# Patient Record
Sex: FEMALE | Race: WHITE
[De-identification: names, ages, dates, MRNs, and addresses within clinical notes are randomized per-mention and may not be internally consistent; named-entity substitution may affect disease eponyms.]

---

## 2018-02-20 ENCOUNTER — HOSPITAL ENCOUNTER (OUTPATIENT)
Dept: HOSPITAL 17 - CLAB | Age: 73
End: 2018-02-20
Attending: FAMILY MEDICINE
Payer: MEDICARE

## 2018-02-20 DIAGNOSIS — C93.10: Primary | ICD-10-CM

## 2018-02-20 LAB
ALBUMIN SERPL-MCNC: 4.7 GM/DL (ref 3.4–5)
ALP SERPL-CCNC: 424 U/L (ref 45–117)
ALT SERPL-CCNC: 42 U/L (ref 10–53)
AST SERPL-CCNC: 123 U/L (ref 15–37)
BASOPHILS NFR BLD AUTO: 2 % (ref 0–2)
BILIRUB SERPL-MCNC: 0.9 MG/DL (ref 0.2–1)
BUN SERPL-MCNC: 16 MG/DL (ref 7–18)
CALCIUM SERPL-MCNC: 9 MG/DL (ref 8.5–10.1)
CHLORIDE SERPL-SCNC: 104 MEQ/L (ref 98–107)
CREAT SERPL-MCNC: 1.14 MG/DL (ref 0.5–1)
ERYTHROCYTE [DISTWIDTH] IN BLOOD BY AUTOMATED COUNT: 14.1 % (ref 11.6–17.2)
GFR SERPLBLD BASED ON 1.73 SQ M-ARVRAT: 47 ML/MIN (ref 89–?)
HCO3 BLD-SCNC: 26.5 MEQ/L (ref 21–32)
HCT VFR BLD CALC: 38.1 % (ref 35–46)
HGB BLD-MCNC: 12.5 GM/DL (ref 11.6–15.3)
LYMPHOCYTES: 5 % (ref 9–44)
MCH RBC QN AUTO: 27.7 PG (ref 27–34)
MCHC RBC AUTO-ENTMCNC: 32.8 % (ref 32–36)
MCV RBC AUTO: 84.2 FL (ref 80–100)
METAMYELOCYTES NFR BLD: 1 % (ref 0–1)
MONOCYTES: 40 % (ref 0–8)
MYELOCYTES NFR BLD: 4 % (ref 0–0)
NEUTS BAND # BLD MANUAL: 54.7 TH/MM3 (ref 1.8–7.7)
NEUTS BAND NFR BLD: 19 % (ref 0–6)
NEUTS SEG NFR BLD MANUAL: 29 % (ref 16–70)
PLATELET # BLD: 102 TH/MM3 (ref 150–450)
PMV BLD AUTO: 10 FL (ref 7–11)
PROT SERPL-MCNC: 8.3 GM/DL (ref 6.4–8.2)
RBC # BLD AUTO: 4.52 MIL/MM3 (ref 4–5.3)
SODIUM SERPL-SCNC: 138 MEQ/L (ref 136–145)
WBC # BLD AUTO: 103.3 TH/MM3 (ref 4–11)

## 2018-02-20 PROCEDURE — 85027 COMPLETE CBC AUTOMATED: CPT

## 2018-02-20 PROCEDURE — 85007 BL SMEAR W/DIFF WBC COUNT: CPT

## 2018-02-20 PROCEDURE — 80053 COMPREHEN METABOLIC PANEL: CPT

## 2018-02-20 PROCEDURE — 36415 COLL VENOUS BLD VENIPUNCTURE: CPT

## 2018-04-12 ENCOUNTER — HOSPITAL ENCOUNTER (INPATIENT)
Dept: HOSPITAL 17 - NEDDLT | Age: 73
LOS: 1 days | Discharge: HOME | DRG: 864 | End: 2018-04-13
Attending: FAMILY MEDICINE | Admitting: FAMILY MEDICINE
Payer: MEDICARE

## 2018-04-12 VITALS
RESPIRATION RATE: 17 BRPM | DIASTOLIC BLOOD PRESSURE: 60 MMHG | OXYGEN SATURATION: 96 % | SYSTOLIC BLOOD PRESSURE: 123 MMHG | TEMPERATURE: 98.8 F | HEART RATE: 91 BPM

## 2018-04-12 VITALS
TEMPERATURE: 99.2 F | SYSTOLIC BLOOD PRESSURE: 112 MMHG | DIASTOLIC BLOOD PRESSURE: 59 MMHG | HEART RATE: 88 BPM | OXYGEN SATURATION: 99 %

## 2018-04-12 VITALS
OXYGEN SATURATION: 97 % | SYSTOLIC BLOOD PRESSURE: 123 MMHG | RESPIRATION RATE: 16 BRPM | HEART RATE: 89 BPM | TEMPERATURE: 99.2 F | DIASTOLIC BLOOD PRESSURE: 67 MMHG

## 2018-04-12 VITALS
RESPIRATION RATE: 17 BRPM | DIASTOLIC BLOOD PRESSURE: 58 MMHG | HEART RATE: 85 BPM | OXYGEN SATURATION: 99 % | SYSTOLIC BLOOD PRESSURE: 121 MMHG | TEMPERATURE: 98.5 F

## 2018-04-12 VITALS
SYSTOLIC BLOOD PRESSURE: 112 MMHG | RESPIRATION RATE: 14 BRPM | OXYGEN SATURATION: 99 % | TEMPERATURE: 99.2 F | HEART RATE: 88 BPM | DIASTOLIC BLOOD PRESSURE: 59 MMHG

## 2018-04-12 VITALS
SYSTOLIC BLOOD PRESSURE: 112 MMHG | TEMPERATURE: 98.3 F | OXYGEN SATURATION: 98 % | HEART RATE: 91 BPM | DIASTOLIC BLOOD PRESSURE: 56 MMHG | RESPIRATION RATE: 18 BRPM

## 2018-04-12 VITALS
RESPIRATION RATE: 26 BRPM | OXYGEN SATURATION: 97 % | DIASTOLIC BLOOD PRESSURE: 55 MMHG | HEART RATE: 100 BPM | TEMPERATURE: 98.3 F | SYSTOLIC BLOOD PRESSURE: 94 MMHG

## 2018-04-12 VITALS — HEART RATE: 88 BPM

## 2018-04-12 DIAGNOSIS — D61.810: ICD-10-CM

## 2018-04-12 DIAGNOSIS — T45.1X5A: ICD-10-CM

## 2018-04-12 DIAGNOSIS — Z88.2: ICD-10-CM

## 2018-04-12 DIAGNOSIS — R50.9: Primary | ICD-10-CM

## 2018-04-12 DIAGNOSIS — I10: ICD-10-CM

## 2018-04-12 DIAGNOSIS — Z85.828: ICD-10-CM

## 2018-04-12 DIAGNOSIS — C93.10: ICD-10-CM

## 2018-04-12 DIAGNOSIS — E78.5: ICD-10-CM

## 2018-04-12 DIAGNOSIS — Z88.0: ICD-10-CM

## 2018-04-12 LAB
BASO STIPL BLD QL SMEAR: (no result)
BUN SERPL-MCNC: 12 MG/DL (ref 7–18)
CALCIUM SERPL-MCNC: 7.7 MG/DL (ref 8.5–10.1)
CHLORIDE SERPL-SCNC: 111 MEQ/L (ref 98–107)
CREAT SERPL-MCNC: 0.72 MG/DL (ref 0.5–1)
DACRYOCYTES BLD QL SMEAR: (no result)
DOHLE BOD BLD QL SMEAR: PRESENT
ERYTHROCYTE [DISTWIDTH] IN BLOOD BY AUTOMATED COUNT: 24.4 % (ref 11.6–17.2)
GFR SERPLBLD BASED ON 1.73 SQ M-ARVRAT: 80 ML/MIN (ref 89–?)
GLUCOSE SERPL-MCNC: 99 MG/DL (ref 74–106)
HCO3 BLD-SCNC: 24 MEQ/L (ref 21–32)
HCT VFR BLD CALC: 22 % (ref 35–46)
HGB BLD-MCNC: 7.3 GM/DL (ref 11.6–15.3)
LYMPHOCYTES: 32 % (ref 9–44)
MCH RBC QN AUTO: 31.7 PG (ref 27–34)
MCHC RBC AUTO-ENTMCNC: 33.4 % (ref 32–36)
MCV RBC AUTO: 94.9 FL (ref 80–100)
MONOCYTES: 21 % (ref 0–8)
NEUTS BAND # BLD MANUAL: 1.5 TH/MM3 (ref 1.8–7.7)
NEUTS BAND NFR BLD: 14 % (ref 0–6)
NEUTS SEG NFR BLD MANUAL: 32 % (ref 16–70)
OVALOCYTES BLD QL SMEAR: (no result)
PLATELET # BLD: 17 TH/MM3 (ref 150–450)
PMV BLD AUTO: 9.6 FL (ref 7–11)
RBC # BLD AUTO: 2.31 MIL/MM3 (ref 4–5.3)
SODIUM SERPL-SCNC: 141 MEQ/L (ref 136–145)
TOXIC GRANULES BLD QL SMEAR: (no result)
WBC # BLD AUTO: 3.3 TH/MM3 (ref 4–11)

## 2018-04-12 PROCEDURE — 87086 URINE CULTURE/COLONY COUNT: CPT

## 2018-04-12 PROCEDURE — 71045 X-RAY EXAM CHEST 1 VIEW: CPT

## 2018-04-12 PROCEDURE — 81001 URINALYSIS AUTO W/SCOPE: CPT

## 2018-04-12 PROCEDURE — 94150 VITAL CAPACITY TEST: CPT

## 2018-04-12 PROCEDURE — 96361 HYDRATE IV INFUSION ADD-ON: CPT

## 2018-04-12 PROCEDURE — 85007 BL SMEAR W/DIFF WBC COUNT: CPT

## 2018-04-12 PROCEDURE — 87040 BLOOD CULTURE FOR BACTERIA: CPT

## 2018-04-12 PROCEDURE — 80053 COMPREHEN METABOLIC PANEL: CPT

## 2018-04-12 PROCEDURE — 85730 THROMBOPLASTIN TIME PARTIAL: CPT

## 2018-04-12 PROCEDURE — 86901 BLOOD TYPING SEROLOGIC RH(D): CPT

## 2018-04-12 PROCEDURE — 85610 PROTHROMBIN TIME: CPT

## 2018-04-12 PROCEDURE — 80048 BASIC METABOLIC PNL TOTAL CA: CPT

## 2018-04-12 PROCEDURE — 85025 COMPLETE CBC W/AUTO DIFF WBC: CPT

## 2018-04-12 PROCEDURE — 96365 THER/PROPH/DIAG IV INF INIT: CPT

## 2018-04-12 PROCEDURE — 87804 INFLUENZA ASSAY W/OPTIC: CPT

## 2018-04-12 PROCEDURE — 85027 COMPLETE CBC AUTOMATED: CPT

## 2018-04-12 PROCEDURE — 96375 TX/PRO/DX INJ NEW DRUG ADDON: CPT

## 2018-04-12 PROCEDURE — 86850 RBC ANTIBODY SCREEN: CPT

## 2018-04-12 PROCEDURE — 86900 BLOOD TYPING SEROLOGIC ABO: CPT

## 2018-04-12 PROCEDURE — 83605 ASSAY OF LACTIC ACID: CPT

## 2018-04-12 RX ADMIN — STANDARDIZED SENNA CONCENTRATE AND DOCUSATE SODIUM SCH TAB: 8.6; 5 TABLET, FILM COATED ORAL at 21:00

## 2018-04-12 RX ADMIN — AZTREONAM SCH MLS/HR: 2 INJECTION, POWDER, LYOPHILIZED, FOR SOLUTION INTRAMUSCULAR; INTRAVENOUS at 14:15

## 2018-04-12 RX ADMIN — ESTROGENS, CONJUGATED SCH MG: 0.62 TABLET, FILM COATED ORAL at 12:00

## 2018-04-12 RX ADMIN — STANDARDIZED SENNA CONCENTRATE AND DOCUSATE SODIUM SCH TAB: 8.6; 5 TABLET, FILM COATED ORAL at 08:50

## 2018-04-12 RX ADMIN — ENALAPRIL MALEATE SCH MG: 10 TABLET ORAL at 13:03

## 2018-04-12 RX ADMIN — Medication SCH ML: at 08:51

## 2018-04-12 RX ADMIN — AZTREONAM SCH MLS/HR: 2 INJECTION, POWDER, LYOPHILIZED, FOR SOLUTION INTRAMUSCULAR; INTRAVENOUS at 06:37

## 2018-04-12 RX ADMIN — AZTREONAM SCH MLS/HR: 2 INJECTION, POWDER, LYOPHILIZED, FOR SOLUTION INTRAMUSCULAR; INTRAVENOUS at 22:07

## 2018-04-12 RX ADMIN — Medication SCH ML: at 22:07

## 2018-04-12 NOTE — HHI.HP
[facility]


Service


Family Medicine


Primary Care Physician


Tyrone Beauchamp MD


Admission Diagnosis





Diagnoses:  


International Travel<30 Days:  No


Contact w/Intl Traveler<30days:  No


History of Present Illness


73-year-old female, past medical history of CML with recent IV regimen 

treatment ending on , presents with asymptomatic fevers at home. Patient 

states she is instructed to take her temperature twice a day, and she only 

somewhat follows this regimen. At home should a temperature at 6 PM on  of 

100.4, and then a repeat temperature at 9:30 PM on  of 102.0. Patient called 

her on-call physician oncologist who told her to go to the nearest ED for a 

workup. Patient went to the delay and ED and had a temperature of 100.3 at 

2300. She continued to be asymptomatic, aside from feeling quotation icky". She 

denies any body aches. Denies any nausea/vomiting. Patient denies any shortness 

of breath or cough. No respiratory difficulties. No chest pain. No change in 

urination or increased burning with urination. No change in bowel movements. No 

abdominal pain or cramping.


 (Adri Lee MD R2)





Review of Systems


Constitutional:  DENIES: Fatigue, Weight gain


Endocrine:  DENIES: Polyuria, Polyphagia


Eyes:  DENIES: Diplopia, Eye inflammation


Ears, nose, mouth, throat:  DENIES: Nasal discharge, Oral lesions


Respiratory:  DENIES: Shortness of breath


Cardiovascular:  DENIES: Palpitations, Syncope


Gastrointestinal:  DENIES: Diarrhea, Nausea


Genitourinary:  DENIES: Dysmenorrhea, Dyspareunia


Musculoskeletal:  DENIES: Back pain, Neck pain


Neurologic:  DENIES: Headache


Psychiatric:  DENIES: Depression (Adri Lee MD R2)





Past Family Social History


Past Medical History


Hypertension


hyperlipidemia


hormone replacement therapy post menopause. 


CML, under care of Dr. Conway and [facility]


Past Surgical History


Bilateral breast augmentation


removal of basal cell carcinomas


 bilateral cataract extraction 


intraocular lens implantation 


removal of perivaginal benign growth 2014


Spiral comminuted fracture of the right femur 1982


Bunion surgery x 2


 (Adri Lee MD R2)


Allergies:  


Coded Allergies:  


     Sulfa (Sulfonamide Antibiotics) (Unverified  Allergy, Intermediate, Fever 

and Rash, 18)


     penicillin G (Unverified  Allergy, Intermediate, Rash, 18)


     bacitracin (Unverified  Allergy, Mild, Rash, 18)


     polymyxin B (Unverified  Allergy, Mild, Rash, 18)


Family History


Father  age 84, Occasions of squamous cell carcinoma, mother  

age 74, history of a myocardial infarction.  Agent has 3 sisters and 2 brothers

, all alive and well.  Patient has a son who is institutionalized, has heart 

failure, brain damage, reduced lung function.  Positive family history of 

hypertension, alcoholism, kidney disease and squamous cell carcinoma.


Social History


Formally educated through law school.  Single.  Retired in  from being 

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 

commissioner for the town [facility]





Tobacco none, occasional alcohol.  Exercises regularly


 (Adri Lee MD R2)





Physical Exam


Vital Signs





Vital Signs








  Date Time  Temp Pulse Resp B/P (MAP) Pulse Ox O2 Delivery O2 Flow Rate FiO2


 


18 07:38 99.2 88 14 112/59 (76) 99   


 


18 06:15      Room Air  


 


18 05:15 98.5 85 17 121/58 (79) 99   








Physical Exam


GENERAL: This is a well-nourished, well-developed patient, in no apparent 

distress.


SKIN: No rashes, ecchymoses or lesions. Cool and dry.


HEAD: Atraumatic. Normocephalic. No temporal or scalp tenderness.


EYES: Pupils equal round and reactive. Extraocular motions intact. No scleral 

icterus. No injection or drainage. 


ENT: Nose without bleeding, purulent drainage or septal hematoma. Throat 

without erythema, tonsillar hypertrophy or exudate. Uvula midline. Airway 

patent.


NECK: Trachea midline. No JVD or lymphadenopathy. Supple, nontender, no 

meningeal signs.


CARDIOVASCULAR: Regular rate and rhythm without murmurs, gallops, or rubs. 


RESPIRATORY: Clear to auscultation. Breath sounds equal bilaterally. Mild 

crackles in LLL


GASTROINTESTINAL: Abdomen soft, non-tender, nondistended. No hepato-splenomegaly

, or palpable masses. No guarding. No peritoneal signs


MUSCULOSKELETAL: Extremities without clubbing, cyanosis, or edema. No joint 

tenderness, effusion, or edema noted. No calf tenderness. Negative Homans sign 

bilaterally.


NEUROLOGICAL: Awake and alert. Cranial nerves II through XII intact.  Motor and 

sensory grossly within normal limits. Five out of 5 muscle strength in all 

muscle groups.  Normal speech.


 (Adri Lee MD R2)





Septic Shock Reassessment


Septic shock perfusion:  reassessment completed


 (Adri Lee MD R2)





Caprini VTE Risk Assessment


Caprini VTE Risk Assessment:  No/Low Risk (score <= 1)


Caprini Risk Assessment Model











 Point Value = 1          Point Value = 2  Point Value = 3  Point Value = 5


 


Age 41-60


Minor surgery


BMI > 25 kg/m2


Swollen legs


Varicose veins


Pregnancy or postpartum


History of unexplained or recurrent


   spontaneous 


Oral contraceptives or hormone


   replacement


Sepsis (< 1 month)


Serious lung disease, including


   pneumonia (< 1 month)


Abnormal pulmonary function


Acute myocardial infarction


Congestive heart failure (< 1 month)


History of inflammatory bowel disease


Medical patient at bed rest Age 61-74


Arthroscopic surgery


Major open surgery (> 45 min)


Laparoscopic surgery (> 45 min)


Malignancy


Confined to bed (> 72 hours)


Immobilizing plaster cast


Central venous access Age >= 75


History of VTE


Family history of VTE


Factor V Leiden


Prothrombin 15648H


Lupus anticoagulant


Anticardiolipin antibodies


Elevated serum homocysteine


Heparin-induced thrombocytopenia


Other congenital or acquired


   thrombophilia Stroke (< 1 month)


Elective arthroplasty


Hip, pelvis, or leg fracture


Acute spinal cord injury (< 1 month)








Prophylaxis Regimen











   Total Risk


Factor Score Risk Level Prophylaxis Regimen


 


0-1      Low Early ambulation


 


2 Moderate Order ONE of the following:


*Sequential Compression Device (SCD)


*Heparin 5000 units SQ BID


 


3-4 Higher Order ONE of the following medications:


*Heparin 5000 units SQ TID


*Enoxaparin/Lovenox 40 mg SQ daily (WT < 150 kg, CrCl > 30 mL/min)


*Enoxaparin/Lovenox 30 mg SQ daily (WT < 150 kg, CrCl > 10-29 mL/min)


*Enoxaparin/Lovenox 30 mg SQ BID (WT < 150 kg, CrCl > 30 mL/min)


AND/OR


*Sequential Compression Device (SCD)


 


5 or more Highest Order ONE of the following medications:


*Heparin 5000 units SQ TID (Preferred with Epidurals)


*Enoxaparin/Lovenox 40 mg SQ daily (WT < 150 kg, CrCl > 30 mL/min)


*Enoxaparin/Lovenox 30 mg SQ daily (WT < 150 kg, CrCl > 10-29 mL/min)


*Enoxaparin/Lovenox 30 mg SQ BID (WT < 150 kg, CrCl > 30 mL/min)


AND


*Sequential Compression Device (SCD)








 (Adri Lee MD R2)





Assessment and Plan


Assessment and Plan


73-year-old female, history of CML with recent chemotherapy treatment, presents 

with fever of unknown origin. Now afebrile since  at 00:46. Asymptomatic.


Code Status


Full code


Discussed Condition With


Dr. Nito De La Cruz


 (Adri Lee MD R2)


Attending Attestation


Patient seen and examined, discussed with resident team. I agree with 

assessment and management as documented and discussed with me.


The patient has been seen and examined. The chart and all resident notes have 

been reviewed. I agree that inpatient care is appropriate and that a two 

midnight stay is expected for the reasons documented in the resident history 

and physical. I have discussed this with the resident and certify the resident

s order for inpatient admission.





Pt without complaints. She denies cough, SOB, chest pain, nausea/vomiting, 

dysuria. 


She is afebrile since arrival to the hospital.





Continue antibiotics.


Await cultures.


Await resp panel.





Additional diagnosis:


Chronic myelomonocytic leukemia: Follows with Dr Conway. Appreciate oncology, 

who has been consulted.


 (Estella Mayo MD)


Problem List:  


(1) SIRS (systemic inflammatory response syndrome)


ICD Codes:  R65.10 - Systemic inflammatory response syndrome (SIRS) of non-

infectious origin without acute organ dysfunction


Status:  Acute


Plan:  On admission: Temp 100.3, , RR 20, no obvious source , asymptomatic


- UA: small blood, moderate bacteria, negative leuk est, negative nitrite


- CXR negative





f/u blood cultures


f/u urine cx


f/u Resp Panel


f/u VS





Incentive spirometer at bedside





(2) Monocytosis


ICD Codes:  D72.821 - Monocytosis (symptomatic)


Status:  Chronic


Plan:  Dx CML,  Dr. Arturo Conway as well as [facility] oncologist Dr. Banerjee


Last discussed in  with  PCP


 Patient is currently undergoing chemotherapy with decitabine 20mg/m2 on days 1-

5 every 28 days, last regimen finished on 





She has been having her platelet count monitored very closely as it has been 

trending downward.  She is aware that she will require a platelet transfusion 

should her platelet count approach 10,000 (sooner if she has indications of 

bleeding) and her most recent blood work performed 18 indicated her 

platelet count was 17,000





f/u recs of 





(3) Thrombocytopenia


ICD Codes:  D69.6 - Thrombocytopenia, unspecified


Status:  Chronic


Plan:  Stable


Related to CML as above


Continue to follow





(4) Hypertension


ICD Codes:  I10 - Essential (primary) hypertension


Status:  Chronic


Plan:  BP WNL


Continue home regimen





(5) fen/ppx


Status:  Acute


Plan:  Fluids: Encourage by mouth fluid


Electrolytes: Follow-up BMP and replete as needed


Nutrition: Regular diet





GI prophylaxis: Not indicated


DVT prophylaxis: Thrombocytopenic, not indicated


 (Adri Lee MD R2)





Physician Certification


2 Midnight Certification Type:  Admission for Inpatient Services


Order for Inpatient Services


The services are ordered in accordance with Medicare regulations or non-

Medicare payer requirements, as applicable.  In the case of services not 

specified as inpatient-only, they are appropriately provided as inpatient 

services in accordance with the 2-midnight benchmark.


Estimated LOS (days):  2


 days is the estimated time the patient will need to remain in the hospital, 

assuming treatment plan goals are met and no additional complications.


Post-Hospital Plan:  Home


 (Adri Lee MD R2)











Adri Lee MD R2 2018 09:48


Estella Mayo MD 2018 07:09

## 2018-04-12 NOTE — MB
cc:

Arturo Conway MD

****

 

 

DATE:

04/12/2018

 

REASON FOR CONSULTATION:

Patient with fever and history of chronic myelomonocytic leukemia,

undergoing treatment with decitabine.

 

PATIENT PROFILE:

The patient is a 73-year-old female.  She is .  She has been 

 3 times.  She has a male .  She has never smoked.  

She has 1 son who is 51, brain damaged and lives in an institution.  

The patient lives in her own home.  When she was well she would 

exercise regularly.  She was born in [place] and has lived in [place] 

since 1955.  She is a retired  and worked for the [employer].  She

currently does not drink.  In the past she had 2 drinks a day.  She 

has been very active until her current illness enjoying sailing, 

reading, traveling, [employer] and bridge.

 

HISTORY OF PRESENT ILLNESS:

The patient has been in excellent health.  In approximately 2009, she 

was noted to have a slight increase in the percentage of monocytes.  

This was stable until 07/2017 when the white count anu to 11,400 with

29%, monocytes.

 

On 11/14/2017, the patient had a hemoglobin of 12.5, white count 

18,000 with 45% polys, 43% monocytes and a platelet count of 89,000.

 

She underwent a bone marrow aspirate and biopsy.  She was found to 

have chronic myelomonocytic leukemia, which was rapidly progressive 

with a white count rising weekly.

 

I referred her to the [facility].  She was initially 

treated with hydroxyurea and most recently has received decitabine.  I

believe her last treatment with decitabine was approximately 11 days 

ago.

 

Last week, she went to the [facility] ER for discussion 

regarding bone marrow transplant.  She had 4 siblings who were 

evaluated and none were a match.  This was devastating for her.

 

Last night, she developed a temperature to 102.  She did not have any 

specific symptoms.  She came to the emergency room at the request of 

my partner.  She had a CBC and platelet count done on 04/12.  

Hemoglobin was 8.4, white count 4800, and platelets 18,000.  The 

absolute monocyte count was 1300.  The absolute neutrophil count was 

2300.  In spite of the platelet count of 18,000 she has had no 

bleeding.  There were no blasts noted on the peripheral smear.

 

She had blood cultures drawn, which are pending.  A urine culture is 

pending.  Influenza nasal washing is negative.  A UA was suggestive of

an infection with a moderate amount of bacteria but was not a good clean catch. 

 

Since the hospitalization, her temperature has fallen. When she 

arrived it was 100.5 and she is currently afebrile.  Her most recent 

CBC and platelet count- hemoglobin 7.3, white count 3300, platelets 

17,000 with total neutrophil count of 1500.  In spite of the low 

hemoglobin, she denies any shortness of breath, chest pain, or 

lightheadedness.

 

PAST SURGICAL HISTORY:

1.  Comminuted fracture of the right femur as a result of a skiing 

accident in [place], [place] requiring a plate and 17 screws.  The 

hardware was removed in 1984.

2.  Partial hysterectomy in 1997 for uterine bleeding, ovaries remain.

3.  She has had breast implants, which were removed and then replaced.

4.  Plastic surgery in 1989.

5.  Tubal ligation.

 

PAST MEDICAL HISTORY:

1.  Diagnosis of chronic myelomonocytic leukemia starting decitabine 

20 mg/m2 IV daily for 5 days on 03/26/2018.

2.  History of hypertension.

3.  Previous history of irritable bowel syndrome.

4.  Diverticulosis.

5.  Cataracts.

 

ALLERGIES:

1.  ____.

2.  BACITRACIN.

3.  PENICILLIN.

4.  POLYMYXIN.

5.  SULFA.

 

MEDICATIONS PRIOR TO ADMISSION:

1.  Recent decitabine.

2.  Vasotec 10 mg a day.

3.  Estrogen 0.625 mg a day.

4.  Multiple vitamins.

 

CURRENT MEDICINES IN THE HOSPITAL:

Include Azactam 2000 mg q. 8 hours.

 

FAMILY HISTORY:

Noncontributory.

 

REVIEW OF SYSTEMS:

No change in hearing.  No chest pain, palpitations.  No shortness of 

breath or chest pain.  No abdominal or pelvic pain .  No dysuria or 

burning.  There has been some urinary frequency.  No change in bowel 

habits.

PSYCHIATRIC:  Recent depression and anxiety related to her illness.

 

PHYSICAL EXAMINATION:

GENERAL:  The patient appears well.

VITAL SIGNS:  Blood pressure 112/60, respiratory rate 18, pulse 90, 

afebrile, O2 saturation 98% on room air.

HEENT:  Head is normocephalic.  Sclerae and conjunctivae are normal.  

Oropharynx unremarkable.

LYMPHATICS:  There is no cervical, supraclavicular, axillary or 

inguinal adenopathy.

HEART:  Regular rate and rhythm.

LUNGS:  Clear, without rales, wheezes, or rhonchi.

ABDOMEN:  Without hepatosplenomegaly, masses or tenderness.

EXTREMITIES:  No edema.

MUSCULOSKELETAL:  No bone pain.

NEUROLOGIC:  No weakness.  Cognition, affect normal.

SKIN:  Normal.

PSYCHIATRIC: The patient appropriately anxious and due to some extent 

depressed.

 

ASSESSMENT:

The patient is a 73-year-old female.  She has chronic myelomonocytic 

leukemia.  Her immediate pancytopenia is due to the decitabine.  It 

appears that her fever may be related to a urinary tract infection 

with cultures pending and her temperature has come down.  Her 

hemoglobin is 7.3.  I suggest that she consider a transfusion, but she

would like to wait until tomorrow.

 

PLAN:

1.  Continue Azactam.

2.  CBC and platelet count tomorrow and if hemoglobin drops, I would 

certainly want to give her a transfusion and with irradiated blood.

3.  If she is afebrile tomorrow  and blood cultures are negative, it would be 

reasonable to send her home on oral antibiotic such as Levaquin or 

ciprofloxacin.

4.  She has thrombocytopenia.  She is not bleeding.  If the platelet 

count reaches 10,000 or less, then I would give her platelet 

transfusion, but at the present time, I would leave her alone.

5.  Four of her family members do not match for a transplant.  She has

a fifth member who will possibly undergo evaluation.

 

 

__________________________________

MD OSCAR Mendoza/rt

D: 04/12/2018, 09:30 PM

T: 04/12/2018, 10:07 PM

Visit #: [id number]

Job #: [id number]

KANDACE

## 2018-04-13 VITALS — HEART RATE: 90 BPM

## 2018-04-13 VITALS
DIASTOLIC BLOOD PRESSURE: 65 MMHG | TEMPERATURE: 99.3 F | HEART RATE: 91 BPM | OXYGEN SATURATION: 97 % | RESPIRATION RATE: 17 BRPM | SYSTOLIC BLOOD PRESSURE: 129 MMHG

## 2018-04-13 VITALS — HEART RATE: 86 BPM

## 2018-04-13 VITALS — HEART RATE: 91 BPM

## 2018-04-13 VITALS
TEMPERATURE: 98.5 F | RESPIRATION RATE: 16 BRPM | SYSTOLIC BLOOD PRESSURE: 116 MMHG | OXYGEN SATURATION: 97 % | DIASTOLIC BLOOD PRESSURE: 65 MMHG | HEART RATE: 84 BPM

## 2018-04-13 VITALS
RESPIRATION RATE: 16 BRPM | HEART RATE: 87 BPM | DIASTOLIC BLOOD PRESSURE: 63 MMHG | SYSTOLIC BLOOD PRESSURE: 120 MMHG | TEMPERATURE: 99.1 F | OXYGEN SATURATION: 98 %

## 2018-04-13 VITALS — HEART RATE: 89 BPM

## 2018-04-13 VITALS — HEART RATE: 88 BPM

## 2018-04-13 LAB
ACANTHOCYTES BLD QL SMEAR: (no result)
ALBUMIN SERPL-MCNC: 2.9 GM/DL (ref 3.4–5)
ALP SERPL-CCNC: 128 U/L (ref 45–117)
ALT SERPL-CCNC: 19 U/L (ref 10–53)
AST SERPL-CCNC: 15 U/L (ref 15–37)
BASOPHILS # BLD AUTO: 0 TH/MM3 (ref 0–0.2)
BASOPHILS NFR BLD: 0.7 % (ref 0–2)
BILIRUB SERPL-MCNC: 1.2 MG/DL (ref 0.2–1)
BUN SERPL-MCNC: 13 MG/DL (ref 7–18)
CALCIUM SERPL-MCNC: 8.1 MG/DL (ref 8.5–10.1)
CHLORIDE SERPL-SCNC: 112 MEQ/L (ref 98–107)
CREAT SERPL-MCNC: 0.65 MG/DL (ref 0.5–1)
DACRYOCYTES BLD QL SMEAR: (no result)
EOSINOPHIL # BLD: 0 TH/MM3 (ref 0–0.4)
EOSINOPHIL NFR BLD: 0.7 % (ref 0–4)
ERYTHROCYTE [DISTWIDTH] IN BLOOD BY AUTOMATED COUNT: 24.5 % (ref 11.6–17.2)
GFR SERPLBLD BASED ON 1.73 SQ M-ARVRAT: 90 ML/MIN (ref 89–?)
GLUCOSE SERPL-MCNC: 99 MG/DL (ref 74–106)
HCO3 BLD-SCNC: 23.5 MEQ/L (ref 21–32)
HCT VFR BLD CALC: 21.1 % (ref 35–46)
HELMET CELLS BLD QL SMEAR: (no result)
HGB BLD-MCNC: 7.1 GM/DL (ref 11.6–15.3)
LYMPHOCYTES # BLD AUTO: 1 TH/MM3 (ref 1–4.8)
LYMPHOCYTES NFR BLD AUTO: 38.8 % (ref 9–44)
MCH RBC QN AUTO: 31.7 PG (ref 27–34)
MCHC RBC AUTO-ENTMCNC: 33.8 % (ref 32–36)
MCV RBC AUTO: 93.9 FL (ref 80–100)
MONOCYTE #: 0.5 TH/MM3 (ref 0–0.9)
MONOCYTES NFR BLD: 19.6 % (ref 0–8)
NEUTROPHILS # BLD AUTO: 1 TH/MM3 (ref 1.8–7.7)
NEUTROPHILS NFR BLD AUTO: 40.2 % (ref 16–70)
PLATELET # BLD: 24 TH/MM3 (ref 150–450)
PMV BLD AUTO: 11.1 FL (ref 7–11)
PROT SERPL-MCNC: 5.9 GM/DL (ref 6.4–8.2)
RBC # BLD AUTO: 2.25 MIL/MM3 (ref 4–5.3)
SODIUM SERPL-SCNC: 142 MEQ/L (ref 136–145)
WBC # BLD AUTO: 2.5 TH/MM3 (ref 4–11)

## 2018-04-13 RX ADMIN — AZTREONAM SCH MLS/HR: 2 INJECTION, POWDER, LYOPHILIZED, FOR SOLUTION INTRAMUSCULAR; INTRAVENOUS at 06:03

## 2018-04-13 RX ADMIN — STANDARDIZED SENNA CONCENTRATE AND DOCUSATE SODIUM SCH TAB: 8.6; 5 TABLET, FILM COATED ORAL at 09:00

## 2018-04-13 RX ADMIN — ESTROGENS, CONJUGATED SCH MG: 0.62 TABLET, FILM COATED ORAL at 09:00

## 2018-04-13 RX ADMIN — ENALAPRIL MALEATE SCH MG: 10 TABLET ORAL at 09:56

## 2018-04-13 NOTE — PD.ONC.PN
Subjective


Subjective


Remarks


Afebrile overnight. 


Patient resting in bed. 


denies dizziness or fatigue. 


does not want a blood transfusion at this time.





Objective


Data











  Date Time  Temp Pulse Resp B/P (MAP) Pulse Ox O2 Delivery O2 Flow Rate FiO2


 


4/13/18 08:11      Room Air  


 


4/13/18 08:11 98.5 84 16 116/65 (82) 97   


 


4/13/18 07:54  86      


 


4/13/18 06:00  86      


 


4/13/18 05:00  90      


 


4/13/18 04:19 99.3 91 17 129/65 (86) 97   


 


4/13/18 04:00  88      


 


4/13/18 04:00  89      


 


4/13/18 03:00  88      


 


4/13/18 02:00  88      


 


4/13/18 01:00  88      


 


4/13/18 00:23 99.1 87 16 120/63 (82) 98   


 


4/13/18 00:00  88      


 


4/13/18 00:00  85      


 


4/12/18 23:00  88      


 


4/12/18 22:00  88      


 


4/12/18 22:00  92      


 


4/12/18 21:56     97 Room Air  


 


4/12/18 21:56 99.2 89 16 123/67 (85) 97   


 


4/12/18 21:00 98.8 91 17 123/60 (81) 96   


 


4/12/18 16:00 98.3 91 18 112/56 (74) 98   


 


4/12/18 13:38     97 Room Air  


 


4/12/18 11:20 99.8 91 14 121/60 (80) 97   


 


4/12/18 11:20 99.8 91 14 121/60 (80) 97   


 


4/12/18 10:02     99 Room Air  














 4/13/18 4/13/18 4/13/18





 07:00 15:00 23:00


 


Intake Total 360 ml  


 


Output Total 800 ml  


 


Balance -440 ml  








Result Diagram:  


4/13/18 0335                                                                   

             4/13/18 0335





Laboratory Results





Laboratory Tests








Test


  4/12/18


12:48 4/13/18


03:35


 


White Blood Count 3.3 TH/MM3  2.5 TH/MM3 


 


Red Blood Count 2.31 MIL/MM3  2.25 MIL/MM3 


 


Hemoglobin 7.3 GM/DL  7.1 GM/DL 


 


Hematocrit 22.0 %  21.1 % 


 


Mean Corpuscular Volume 94.9 FL  93.9 FL 


 


Mean Corpuscular Hemoglobin 31.7 PG  31.7 PG 


 


Mean Corpuscular Hemoglobin


Concent 33.4 % 


  33.8 % 


 


 


Red Cell Distribution Width 24.4 %  24.5 % 


 


Platelet Count 17 TH/MM3  24 TH/MM3 


 


Mean Platelet Volume 9.6 FL  11.1 FL 


 


CBC Comment AUTO DIFF  AUTO DIFF 


 


Differential Total Cells


Counted 100 


  


 


 


Neutrophils % (Manual) 32 %  


 


Band Neutrophils % 14 %  


 


Lymphocytes % 32 %  


 


Monocytes % 21 %  


 


Eosinophils % 1 %  


 


Neutrophils # (Manual) 1.5 TH/MM3  


 


Differential Comment


  FINAL DIFF


MANUAL AUTO DIFF


CONFIRMED


 


Toxic Granulation 1+  


 


Dohle Bodies PRESENT  


 


Platelet Estimate RARE  LOW 


 


Platelet Morphology Comment NORMAL  NORMAL 


 


Basophilic Stippling FAINT  


 


Tear Drop Cells 1+  1+ 


 


Ovalocytes 1+  


 


Blood Urea Nitrogen 12 MG/DL  13 MG/DL 


 


Creatinine 0.72 MG/DL  0.65 MG/DL 


 


Random Glucose 99 MG/DL  99 MG/DL 


 


Calcium Level 7.7 MG/DL  8.1 MG/DL 


 


Sodium Level 141 MEQ/L  142 MEQ/L 


 


Potassium Level 3.8 MEQ/L  3.9 MEQ/L 


 


Chloride Level 111 MEQ/L  112 MEQ/L 


 


Carbon Dioxide Level 24.0 MEQ/L  23.5 MEQ/L 


 


Anion Gap 6 MEQ/L  7 MEQ/L 


 


Estimat Glomerular Filtration


Rate 80 ML/MIN 


  90 ML/MIN 


 


 


Neutrophils (%) (Auto)  40.2 % 


 


Lymphocytes (%) (Auto)  38.8 % 


 


Monocytes (%) (Auto)  19.6 % 


 


Eosinophils (%) (Auto)  0.7 % 


 


Basophils (%) (Auto)  0.7 % 


 


Neutrophils # (Auto)  1.0 TH/MM3 


 


Lymphocytes # (Auto)  1.0 TH/MM3 


 


Monocytes # (Auto)  0.5 TH/MM3 


 


Eosinophils # (Auto)  0.0 TH/MM3 


 


Basophils # (Auto)  0.0 TH/MM3 


 


Helmet Cells  OCC 


 


Acanthocytes  OCC 


 


Total Protein  5.9 GM/DL 


 


Albumin  2.9 GM/DL 


 


Alkaline Phosphatase  128 U/L 


 


Aspartate Amino Transf


(AST/SGOT) 


  15 U/L 


 


 


Alanine Aminotransferase


(ALT/SGPT) 


  19 U/L 


 


 


Total Bilirubin  1.2 MG/DL 











Administered Medications








 Medications


  (Trade)  Dose


 Ordered  Sig/Neymar


 Route


 PRN Reason  Start Time


 Stop Time Status Last Admin


Dose Admin


 


 Sodium Chloride


  (NS Flush)  2 ml  BID


 IV FLUSH


   4/12/18 09:00


    4/12/18 22:07


 


 


 Aztreonam 2000 mg/


 Sodium Chloride  100 ml @ 


 200 mls/hr  Q8H


 IV


   4/12/18 06:00


    4/13/18 06:03


 


 


 Enalapril Maleate


  (Vasotec)  10 mg  DAILY


 PO


   4/12/18 10:30


    4/12/18 13:03


 








Objective Remarks


GENERAL: pleasant female, sitting up in bed in nad. 


SKIN: Warm and dry.


HEAD: Normocephalic.


EYES: No injection or drainage. 


NECK: Supple, trachea midline. 


CARDIOVASCULAR: Regular rate and rhythm


RESPIRATORY: Breath sounds equal bilaterally. No accessory muscle use.


GASTROINTESTINAL: Abdomen soft, non-tender, nondistended. 


EXTREMITIES: No cyanosis


NEUROLOGICAL: No obvious focal deficit. Awake, alert, and oriented x3.





Assessment/Plan


Assessment


72y/o female with CMML admitted with fever, now afebrile.


Plan


1. the patient is afebrile and feeling improved. she is clear for discharge 

from oncology perspective.  I would send her home on Cipro 500mg PO q 12 hours 

x 7 days. 


2. the patient has a hemoglobin of 7.1.  however she is asymptomatic and 

prefers not to have a blood transfusion at this time.  We will arrange follow 

up in the clinic early next week and obtain repeat CBC and for blood 

transfusion in the clinic.


Attending Statement


The exam, history, and the medical decision-making described in the above note 

were completed with the assistance of the mid-level provider. I reviewed and 

agree with the findings presented.  I attest that I had a face-to-face 

encounter with the patient on the same day, and personally performed and 

documented my assessment and findings in the medical record.


patient doing well this am and anxious to go home.  no bleeding and 

asymptomatic from anemia and she would prefer not to have blood transfusion.   

exam benign and no temperature since those surrounding admission.  At this 

point can go home with po cipro and I will see next week.  She will have a cbc 

plat count either monday or tuesday of next week and if temp reoccurs she will 

contact us or go to ER.   All this was discussed with her.











Kaylan Dennis Apr 13, 2018 09:10


Arturo Conway MD Apr 13, 2018 13:57

## 2018-04-13 NOTE — HHI.FPPN
Subjective


Remarks


Patient seen and examined this morning bedside.  Patient continues to be 

asymptomatic states that her oncologist told her she can go home.  No acute 

events overnight.  No fever/chills.  No chest pain/shortness of breath/

dizziness.


 (Adri Lee MD R2)





Objective


Vitals





Vital Signs








  Date Time  Temp Pulse Resp B/P (MAP) Pulse Ox O2 Delivery O2 Flow Rate FiO2


 


4/13/18 08:11      Room Air  


 


4/13/18 08:11 98.5 84 16 116/65 (82) 97   


 


4/13/18 07:54  86      


 


4/13/18 06:00  86      


 


4/13/18 05:00  90      


 


4/13/18 04:19 99.3 91 17 129/65 (86) 97   


 


4/13/18 04:00  88      


 


4/13/18 04:00  89      


 


4/13/18 03:00  88      


 


4/13/18 02:00  88      


 


4/13/18 01:00  88      


 


4/13/18 00:23 99.1 87 16 120/63 (82) 98   


 


4/13/18 00:00  88      


 


4/13/18 00:00  85      


 


4/12/18 23:00  88      


 


4/12/18 22:00  88      


 


4/12/18 22:00  92      


 


4/12/18 21:56     97 Room Air  


 


4/12/18 21:56 99.2 89 16 123/67 (85) 97   


 


4/12/18 21:00 98.8 91 17 123/60 (81) 96   


 


4/12/18 16:00 98.3 91 18 112/56 (74) 98   


 


4/12/18 13:38     97 Room Air  


 


4/12/18 11:20 99.8 91 14 121/60 (80) 97   


 


4/12/18 11:20 99.8 91 14 121/60 (80) 97   














I/O      


 


 4/12/18 4/12/18 4/12/18 4/13/18 4/13/18 4/13/18





 07:00 15:00 23:00 07:00 15:00 23:00


 


Intake Total  400 ml  360 ml  


 


Output Total    800 ml  


 


Balance  400 ml  -440 ml  


 


      


 


Intake Oral  400 ml  360 ml  


 


Output Urine Total    800 ml  


 


# Voids  3    








 (Adri Lee MD R2)


Result Diagram:  


4/13/18 0335 4/13/18 0335





Objective Remarks


GENERAL: 


SKIN: Warm and dry.


HEAD: Normocephalic.


EYES: No scleral icterus. No injection or drainage. 


NECK: Supple, trachea midline. No JVD or lymphadenopathy.


CARDIOVASCULAR: Regular rate and rhythm without murmurs, gallops, or rubs. 


RESPIRATORY: Breath sounds equal bilaterally. No accessory muscle use.


GASTROINTESTINAL: Abdomen soft, non-tender, nondistended. 


MUSCULOSKELETAL: No cyanosis, or edema. 


BACK: Nontender without obvious deformity. No CVA tenderness.





 (Adri Lee MD R2)





A/P


Assessment and Plan


73-year-old female, history of CML with recent chemotherapy treatment, presents 

with fever of unknown origin. Now afebrile since 4/12 at 00:46. Asymptomatic.


Discharge Planning


Oncology has cleared for discharge today


 (Adri Lee MD R2)


Attending Attestation


Patient seen and examined, discussed with resident team. I agree with 

assessment and management as documented and discussed with me.





Pt without complaints. She feels ready for discharge.


Pt has been cleared by oncology for discharge with PO cipro. Discussed risks, 

benefits, side effects of this medication with patient.





Discharge home today, with follow up labs and evaluation by oncology next week.


 (Estella Mayo MD)


Problem List:  


(1) SIRS (systemic inflammatory response syndrome)


ICD Codes:  R65.10 - Systemic inflammatory response syndrome (SIRS) of non-

infectious origin without acute organ dysfunction


Status:  Acute


Plan:  Vital signs now stable


Afebrile over 24 hours


No source of infection








On admission: Temp 100.3, , RR 20, no obvious source , asymptomatic


- UA: small blood, moderate bacteria, negative leuk est, negative nitrite


- CXR negative





f/u blood cultures


f/u urine cx


f/u Resp Panel


f/u VS





Incentive spirometer at bedside





(2) Monocytosis


ICD Codes:  D72.821 - Monocytosis (symptomatic)


Status:  Chronic


Plan:  Dx CML,  Dr. Arturo Conway as well as [facility] oncologist Dr. Banerjee


Last discussed in 03/18 with  PCP


 Patient is currently undergoing chemotherapy with decitabine 20mg/m2 on days 1-

5 every 28 days, last regimen finished on 4/2





She has been having her platelet count monitored very closely as it has been 

trending downward.  She is aware that she will require a platelet transfusion 

should her platelet count approach 10,000 (sooner if she has indications of 

bleeding) and her most recent blood work performed 4/9/18 indicated her 

platelet count was 17,000





f/u recs of : Oncology is cleared for discharge.  Recommends discharge 

on Cipro 500 mg p.o. every 12 hours 7 days





(3) Thrombocytopenia


ICD Codes:  D69.6 - Thrombocytopenia, unspecified


Status:  Chronic


Plan:  Stable


Related to CML as above


Continue to follow





(4) Anemia


ICD Codes:  D64.9 - Anemia, unspecified


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





Hb of 7.1 (from 7.3 on admission)


Patient is refusing blood transfusion


Oncology is okay with follow-up CBC early next week in follow-up in office 

early next week for a blood transfusion





(5) Hypertension


ICD Codes:  I10 - Essential (primary) hypertension


Status:  Chronic


Plan:  BP WNL


Continue home regimen





(6) fen/ppx


Status:  Acute


Plan:  Fluids: Encourage by mouth fluid


Electrolytes: Follow-up BMP and replete as needed


Nutrition: Regular diet





GI prophylaxis: Not indicated


DVT prophylaxis: Thrombocytopenic, not indicated


 (Adri Lee MD R2)











Adri Lee MD R2 Apr 13, 2018 10:25


Estella Mayo MD Apr 14, 2018 07:04

## 2018-04-13 NOTE — HHI.DCPOC
Discharge Care Plan


Diagnosis:  


(1) SIRS (systemic inflammatory response syndrome)


(2) Monocytosis


Goals to Promote Your Health


* To prevent worsening of your condition and complications


* To maintain your health at the optimal level


Directions to Meet Your Goals


*** Take your medications as prescribed


*** Follow your dietary instruction


*** Follow activity as directed








*** Keep your appointments as scheduled


*** Take your immunizations and boosters as scheduled


*** If your symptoms worsen call your PCP, if no PCP go to Urgent Care Center 

or Emergency Room***


*** Smoking is Dangerous to Your Health. Avoid second hand smoke***


***Call the 24-hour hour crisis hotline for domestic abuse at 1-829.979.8080***











Adri Lee MD R2 Apr 13, 2018 10:28

## 2018-05-09 ENCOUNTER — HOSPITAL ENCOUNTER (OUTPATIENT)
Dept: HOSPITAL 17 - NEPC | Age: 73
Setting detail: OBSERVATION
LOS: 2 days | Discharge: ANOTHER HOSPITAL | End: 2018-05-11
Attending: FAMILY MEDICINE | Admitting: FAMILY MEDICINE
Payer: MEDICARE

## 2018-05-09 VITALS
HEART RATE: 91 BPM | DIASTOLIC BLOOD PRESSURE: 82 MMHG | SYSTOLIC BLOOD PRESSURE: 180 MMHG | RESPIRATION RATE: 19 BRPM | OXYGEN SATURATION: 97 %

## 2018-05-09 VITALS
OXYGEN SATURATION: 100 % | SYSTOLIC BLOOD PRESSURE: 186 MMHG | TEMPERATURE: 99.3 F | RESPIRATION RATE: 22 BRPM | HEART RATE: 100 BPM | DIASTOLIC BLOOD PRESSURE: 81 MMHG

## 2018-05-09 VITALS
SYSTOLIC BLOOD PRESSURE: 195 MMHG | TEMPERATURE: 97.7 F | HEART RATE: 85 BPM | RESPIRATION RATE: 22 BRPM | DIASTOLIC BLOOD PRESSURE: 102 MMHG | OXYGEN SATURATION: 99 %

## 2018-05-09 VITALS — OXYGEN SATURATION: 97 %

## 2018-05-09 VITALS
OXYGEN SATURATION: 97 % | SYSTOLIC BLOOD PRESSURE: 173 MMHG | HEART RATE: 92 BPM | DIASTOLIC BLOOD PRESSURE: 84 MMHG | RESPIRATION RATE: 16 BRPM

## 2018-05-09 VITALS
OXYGEN SATURATION: 97 % | HEART RATE: 82 BPM | DIASTOLIC BLOOD PRESSURE: 76 MMHG | SYSTOLIC BLOOD PRESSURE: 204 MMHG | RESPIRATION RATE: 19 BRPM

## 2018-05-09 DIAGNOSIS — G89.3: ICD-10-CM

## 2018-05-09 DIAGNOSIS — R53.1: ICD-10-CM

## 2018-05-09 DIAGNOSIS — Z90.710: ICD-10-CM

## 2018-05-09 DIAGNOSIS — J45.909: ICD-10-CM

## 2018-05-09 DIAGNOSIS — M54.5: ICD-10-CM

## 2018-05-09 DIAGNOSIS — R94.31: ICD-10-CM

## 2018-05-09 DIAGNOSIS — F40.240: ICD-10-CM

## 2018-05-09 DIAGNOSIS — Z79.890: ICD-10-CM

## 2018-05-09 DIAGNOSIS — I10: ICD-10-CM

## 2018-05-09 DIAGNOSIS — D64.9: ICD-10-CM

## 2018-05-09 DIAGNOSIS — M79.605: ICD-10-CM

## 2018-05-09 DIAGNOSIS — K57.90: ICD-10-CM

## 2018-05-09 DIAGNOSIS — C93.10: Primary | ICD-10-CM

## 2018-05-09 DIAGNOSIS — K58.9: ICD-10-CM

## 2018-05-09 DIAGNOSIS — R74.8: ICD-10-CM

## 2018-05-09 DIAGNOSIS — N02.9: ICD-10-CM

## 2018-05-09 LAB
ALBUMIN SERPL-MCNC: 3.8 GM/DL (ref 3.4–5)
ALP SERPL-CCNC: 544 U/L (ref 45–117)
ALT SERPL-CCNC: 44 U/L (ref 10–53)
AST SERPL-CCNC: 48 U/L (ref 15–37)
BILIRUB SERPL-MCNC: 0.8 MG/DL (ref 0.2–1)
BUN SERPL-MCNC: 13 MG/DL (ref 7–18)
CALCIUM SERPL-MCNC: 9 MG/DL (ref 8.5–10.1)
CHLORIDE SERPL-SCNC: 106 MEQ/L (ref 98–107)
COLOR UR: (no result)
CREAT SERPL-MCNC: 0.73 MG/DL (ref 0.5–1)
DACRYOCYTES BLD QL SMEAR: (no result)
ERYTHROCYTE [DISTWIDTH] IN BLOOD BY AUTOMATED COUNT: 23.9 % (ref 11.6–17.2)
GFR SERPLBLD BASED ON 1.73 SQ M-ARVRAT: 78 ML/MIN (ref 89–?)
GLUCOSE SERPL-MCNC: 115 MG/DL (ref 74–106)
GLUCOSE UR STRIP-MCNC: (no result) MG/DL
HCO3 BLD-SCNC: 24.7 MEQ/L (ref 21–32)
HCT VFR BLD CALC: 23.6 % (ref 35–46)
HGB BLD-MCNC: 7.9 GM/DL (ref 11.6–15.3)
HGB UR QL STRIP: (no result)
INR PPP: 1 RATIO
KETONES UR STRIP-MCNC: (no result) MG/DL
LYMPHOCYTES: 16 % (ref 9–44)
MAGNESIUM SERPL-MCNC: 2 MG/DL (ref 1.5–2.5)
MCH RBC QN AUTO: 28.5 PG (ref 27–34)
MCHC RBC AUTO-ENTMCNC: 33.5 % (ref 32–36)
MCV RBC AUTO: 85.2 FL (ref 80–100)
METAMYELOCYTES NFR BLD: 10 % (ref 0–1)
MONOCYTES: 8 % (ref 0–8)
MYELOCYTES NFR BLD: 1 % (ref 0–0)
NEUTS BAND # BLD MANUAL: 8.9 TH/MM3 (ref 1.8–7.7)
NEUTS BAND NFR BLD: 10 % (ref 0–6)
NEUTS SEG NFR BLD MANUAL: 54 % (ref 16–70)
NITRITE UR QL STRIP: (no result)
OVALOCYTES BLD QL SMEAR: (no result)
PLATELET # BLD: 69 TH/MM3 (ref 150–450)
PMV BLD AUTO: 11.1 FL (ref 7–11)
PROT SERPL-MCNC: 7 GM/DL (ref 6.4–8.2)
PROTHROMBIN TIME: 10.6 SEC (ref 9.8–11.6)
RBC # BLD AUTO: 2.76 MIL/MM3 (ref 4–5.3)
SCHISTOCYTES BLD QL SMEAR: (no result)
SODIUM SERPL-SCNC: 141 MEQ/L (ref 136–145)
SP GR UR STRIP: 1.01 (ref 1–1.03)
SQUAMOUS #/AREA URNS HPF: 1 /HPF (ref 0–5)
TROPONIN I SERPL-MCNC: 0.14 NG/ML (ref 0.02–0.05)
URINE LEUKOCYTE ESTERASE: (no result)
WBC # BLD AUTO: 11.8 TH/MM3 (ref 4–11)

## 2018-05-09 PROCEDURE — G0378 HOSPITAL OBSERVATION PER HR: HCPCS

## 2018-05-09 PROCEDURE — 80053 COMPREHEN METABOLIC PANEL: CPT

## 2018-05-09 PROCEDURE — 73564 X-RAY EXAM KNEE 4 OR MORE: CPT

## 2018-05-09 PROCEDURE — 96375 TX/PRO/DX INJ NEW DRUG ADDON: CPT

## 2018-05-09 PROCEDURE — 73502 X-RAY EXAM HIP UNI 2-3 VIEWS: CPT

## 2018-05-09 PROCEDURE — 81001 URINALYSIS AUTO W/SCOPE: CPT

## 2018-05-09 PROCEDURE — 85730 THROMBOPLASTIN TIME PARTIAL: CPT

## 2018-05-09 PROCEDURE — 84550 ASSAY OF BLOOD/URIC ACID: CPT

## 2018-05-09 PROCEDURE — 72192 CT PELVIS W/O DYE: CPT

## 2018-05-09 PROCEDURE — 73700 CT LOWER EXTREMITY W/O DYE: CPT

## 2018-05-09 PROCEDURE — 83615 LACTATE (LD) (LDH) ENZYME: CPT

## 2018-05-09 PROCEDURE — 82550 ASSAY OF CK (CPK): CPT

## 2018-05-09 PROCEDURE — 84443 ASSAY THYROID STIM HORMONE: CPT

## 2018-05-09 PROCEDURE — 85610 PROTHROMBIN TIME: CPT

## 2018-05-09 PROCEDURE — 71045 X-RAY EXAM CHEST 1 VIEW: CPT

## 2018-05-09 PROCEDURE — 96374 THER/PROPH/DIAG INJ IV PUSH: CPT

## 2018-05-09 PROCEDURE — 99285 EMERGENCY DEPT VISIT HI MDM: CPT

## 2018-05-09 PROCEDURE — 96361 HYDRATE IV INFUSION ADD-ON: CPT

## 2018-05-09 PROCEDURE — 83880 ASSAY OF NATRIURETIC PEPTIDE: CPT

## 2018-05-09 PROCEDURE — 85027 COMPLETE CBC AUTOMATED: CPT

## 2018-05-09 PROCEDURE — 97162 PT EVAL MOD COMPLEX 30 MIN: CPT

## 2018-05-09 PROCEDURE — 96376 TX/PRO/DX INJ SAME DRUG ADON: CPT

## 2018-05-09 PROCEDURE — 83690 ASSAY OF LIPASE: CPT

## 2018-05-09 PROCEDURE — 93005 ELECTROCARDIOGRAM TRACING: CPT

## 2018-05-09 PROCEDURE — 74177 CT ABD & PELVIS W/CONTRAST: CPT

## 2018-05-09 PROCEDURE — 84484 ASSAY OF TROPONIN QUANT: CPT

## 2018-05-09 PROCEDURE — 70450 CT HEAD/BRAIN W/O DYE: CPT

## 2018-05-09 PROCEDURE — 83735 ASSAY OF MAGNESIUM: CPT

## 2018-05-09 PROCEDURE — 85007 BL SMEAR W/DIFF WBC COUNT: CPT

## 2018-05-09 RX ADMIN — STANDARDIZED SENNA CONCENTRATE AND DOCUSATE SODIUM SCH TAB: 8.6; 5 TABLET, FILM COATED ORAL at 21:00

## 2018-05-09 RX ADMIN — ENALAPRILAT PRN MG: 1.25 INJECTION, SOLUTION INTRAVENOUS at 23:16

## 2018-05-09 RX ADMIN — HYDROCODONE BITARTRATE AND ACETAMINOPHEN PRN TAB: 10; 325 TABLET ORAL at 17:03

## 2018-05-09 RX ADMIN — HYDROCODONE BITARTRATE AND ACETAMINOPHEN PRN TAB: 10; 325 TABLET ORAL at 22:03

## 2018-05-09 NOTE — RADRPT
EXAM DATE/TIME:  05/09/2018 13:29 

 

[facility] COMPARISON:     

No previous studies available for comparison.

 

                     

INDICATIONS :     

Left hip and pelvis pain no known injury.

                     

 

MEDICAL HISTORY :            

Hypertension. Leukemia.   

 

SURGICAL HISTORY :     

Hysterectomy.   

 

ENCOUNTER:     

Subsequent                                        

 

ACUITY:     

1 day      

 

PAIN SCORE:     

7/10

 

LOCATION:     

Left  Hip and pelvis.

 

FINDINGS:     

Examination of the left hip was performed with AP Pelvis. There is some subtle heterogeneity of the a
xial skeleton, particularly in the proximal femurs and both ischii. Osseous structures are otherwise 
intact with no acute fracture. 

 

CONCLUSION:     

1. Faint heterogeneity of the axial skeleton as above. Subtle blastic metastasis cannot be excluded.

2. Recommend CT scan of the pelvis without IV contrast for further characterization. 

 

 

 Telly Norton MD on May 09, 2018 at 14:32           

Board Certified Radiologist.

 This report was verified electronically.

## 2018-05-09 NOTE — HHI.HP
[facility]


Service


Family Medicine


Primary Care Physician


Tyrone Beauchamp MD


Admission Diagnosis





NONSTEMI, ANEMIA, LEUKEMIA


Diagnoses:  


International Travel<30 Days:  No


Contact w/Intl Traveler<30days:  No


Known Affected Area:  No


History of Present Illness


Patient is a 73-year-old female with past medical history of hypertension and 

CML (follows with Dr. Conway) presented to the ED from Dr. Conway's office this 

morning due to severe left leg pain requiring use of wheelchair. Patient went 

to Dr. Conway's office today for blood work and was sent to the ED by his nurse 

practitioner because she did not look well. Patient stated that pain is more 

severe on her left hip and left knee causing her to have difficulty ambulating.

  She describes the pain as an aching/soreness on her left thigh.  She has not 

been able to sleep well due to the severe leg pain. Patient stated the left leg 

pain began while she was receiving her second round of chemotherapy (Acetabine) 

last Friday.  Patient has received ibuprofen and steroids for the pain with no 

relief.  She also received Dilaudid which she said helped subside the pain for 

a limited amount of time. Denies chest pain, shortness of breath, nausea 

vomiting, photophobia, neck stiffness, fever or chills.





Of note patient denies ever having similar pain.  She received her first round 

of chemotherapy on  for a total of  4 days without having any similar 

symptoms.








EP physicians and was able to speak with Dr. Conway who was concerned for 

meningeal disease causing the severe left leg pain.  Patient refused LP. 


 (Jose Garcia MD, R1)





Review of Systems


Constitutional:  COMPLAINS OF: Fatigue, DENIES: Fever, Chills, Dizziness, 

Change in appetite


Eyes:  DENIES: Blurred vision, Vision loss


Ears, nose, mouth, throat:  DENIES: Throat pain, Running Nose


Respiratory:  DENIES: Shortness of breath


Cardiovascular:  DENIES: Chest pain


Gastrointestinal:  COMPLAINS OF: Constipation (last Bm yesterday), DENIES: 

Abdominal pain, Bloody stools, Diarrhea, Nausea, Vomiting


Genitourinary:  DENIES: Urinary frequency, Dysuria


Musculoskeletal:  COMPLAINS OF: Joint pain, Muscle aches, Back pain, DENIES: 

Neck pain


Integumentary:  DENIES: Rash


Hematologic/lymphatic:  COMPLAINS OF: Bruising


Neurologic:  DENIES: Headache, Paresthesias, Seizures


Psychiatric:  DENIES: Confusion (Jose Garcia MD, R1)





Past Family Social History


Past Medical History


CML


HTN


Past Surgical History


hysterectomy


cataract surgery


breast implants removed


Reported Medications





Reported Meds & Active Scripts


Active


Enalapril (Enalapril Maleate) 10 Mg Tab 10 Mg PO DAILY


Menest (Estrogens, Esterified) 0.625 Mg Tab 0.625 Mg PO DAILY


Reported


One Daily Multivitamin (Multivitamin) 1 Each Tablet 1 Tab PO DAILY


Vitamin C (Ascorbic Acid) 1,000 Mg Tablet.er 1 Tab PO DAILY


Milk Thistle Unknown Strength Cap Unknown Dose PO DAILY


K2 Plus D3 100-1000 Mcg-Unit (Vitamin D & K) 1,000 Unit-100 Mcg Tab 1 Tab PO 

DAILY


Vitamin D3 (Cholecalciferol) 5,000 Unit Cap 5,000 Units PO DAILY


Prednisone 20 Mg Tab 20 Mg PO DAILY--this am





 (Jose Garcia MD, R1)


Allergies:  


Coded Allergies:  


     Sulfa (Sulfonamide Antibiotics) (Unverified  Allergy, Intermediate, Fever 

and Rash, 18)


     penicillin G (Unverified  Allergy, Intermediate, Rash, 18)


     bacitracin (Unverified  Allergy, Mild, Rash, 18)


     polymyxin B (Unverified  Allergy, Mild, Rash, 18)





Physical Exam


Vital Signs





Vital Signs








  Date Time  Temp Pulse Resp B/P (MAP) Pulse Ox O2 Delivery O2 Flow Rate FiO2


 


18 11:00  91 19 180/82 (114) 97 Room Air  


 


18 10:46     97 Room Air  


 


18 10:08   20  97 Room Air  


 


18 09:28 99.3 100 22 186/81 (116) 100   








Physical Exam


GENERAL: This is a thin white-female patient with moderate concern and  .


SKIN: No rashes or lesions. Cool and dry. Bruise on inner aspect of right arm, 

pt a previous disc to low platelet count.


HEAD: Atraumatic. Normocephalic. 


EYES: Pupils equal round and reactive. Extraocular motions intact. No scleral 

icterus. No injection or drainage. 


ENT: Nose without bleeding, purulent drainage or septal hematoma. Throat 

without erythema, tonsillar hypertrophy or exudate. Uvula midline. Airway 

patent.


NECK: Trachea midline. No JVD or lymphadenopathy. Supple, nontender, no 

meningeal signs. FROM. 


CARDIOVASCULAR: Regular rate and rhythm without murmurs, gallops, or rubs. 


RESPIRATORY: Clear to auscultation. Breath sounds equal bilaterally. No wheezes

, rales, or rhonchi.  


GASTROINTESTINAL: Abdomen soft, non-tender, nondistended. No hepato-splenomegaly

, or palpable masses. No guarding.


MUSCULOSKELETAL: Extremities without clubbing, cyanosis, or edema. No effusion, 

or edema noted. No calf tenderness.  Tender to palpation along upper aspect of 

left knee No spinal tenderness on exam. +2 DP pulses BL.


NEUROLOGICAL: Awake and alert. Cranial nerves II through XII intact. motor and 

sensory grossly within normal limits. 4/5 strength in Left LE. 5/5 strength UE 

bilaterally and Right LE. normal sensation. No focal neurological deficits.  

Normal speech.


Laboratory





Laboratory Tests








Test


  18


10:15 18


11:40


 


White Blood Count 11.8  


 


Red Blood Count 2.76  


 


Hemoglobin 7.9  


 


Hematocrit 23.6  


 


Mean Corpuscular Volume 85.2  


 


Mean Corpuscular Hemoglobin 28.5  


 


Mean Corpuscular Hemoglobin


Concent 33.5 


  


 


 


Red Cell Distribution Width 23.9  


 


Platelet Count 69  


 


Mean Platelet Volume 11.1  


 


CBC Comment AUTO DIFF  


 


Differential Total Cells


Counted 100 


  


 


 


Neutrophils % (Manual) 54  


 


Band Neutrophils % 10  


 


Lymphocytes % 16  


 


Monocytes % 8  


 


Eosinophils % 1  


 


Neutrophils # (Manual) 8.9  


 


Metamyelocytes 10  


 


Myelocytes 1  


 


Differential Comment


  FINAL DIFF


MANUAL 


 


 


Platelet Estimate LOW  


 


Platelet Morphology Comment NORMAL  


 


Tear Drop Cells 2+  


 


Ovalocytes 1+  


 


Keratocytes OCC  


 


Prothrombin Time 10.6  


 


Prothromb Time International


Ratio 1.0 


  


 


 


Activated Partial


Thromboplast Time 24.9 


  


 


 


Blood Urea Nitrogen 13  


 


Creatinine 0.73  


 


Random Glucose 115  


 


Total Protein 7.0  


 


Albumin 3.8  


 


Calcium Level 9.0  


 


Magnesium Level 2.0  


 


Alkaline Phosphatase 544  


 


Aspartate Amino Transf


(AST/SGOT) 48 


  


 


 


Alanine Aminotransferase


(ALT/SGPT) 44 


  


 


 


Total Bilirubin 0.8  


 


Sodium Level 141  


 


Potassium Level 4.1  


 


Chloride Level 106  


 


Carbon Dioxide Level 24.7  


 


Anion Gap 10  


 


Estimat Glomerular Filtration


Rate 78 


  


 


 


Total Creatine Kinase 22  


 


Troponin I 0.14  


 


B-Type Natriuretic Peptide 136  


 


Lipase 181  


 


Thyroid Stimulating Hormone


3rd Gen 0.535 


  


 


 


Urine Color  LIGHT-YELLOW 


 


Urine Turbidity  CLEAR 


 


Urine pH  6.0 


 


Urine Specific Gravity  1.006 


 


Urine Protein  NEG 


 


Urine Glucose (UA)  NEG 


 


Urine Ketones  NEG 


 


Urine Occult Blood  SMALL 


 


Urine Nitrite  NEG 


 


Urine Bilirubin  NEG 


 


Urine Urobilinogen  LESS THAN 2.0 


 


Urine Leukocyte Esterase  NEG 


 


Urine RBC  2 


 


Urine WBC  LESS THAN 1 


 


Urine Squamous Epithelial


Cells 


  1 


 


 


Microscopic Urinalysis Comment


  


  CULT NOT


INDICATED








 (Jose Garcia MD, R1)


Result Diagram:  


18 1015                                                                    

            18 1015





Imaging





Last Impressions








Head CT 18 0950 Signed





Impressions: 





 Service Date/Time:  Wednesday, May 9, 2018 10:18 - CONCLUSION:  1. Chronic 





 changes of mild symmetric cortical atrophy and minimal white matter changes. 

2. 





 Nothing acute.     Telly Norton MD 


 


Chest X-Ray 18 0950 Signed





Impressions: 





 Service Date/Time:  Wednesday, May 9, 2018 10:06 - CONCLUSION:  No acute 





 cardiopulmonary process.     Telly Norton MD 


 


Knee X-Ray 18 0000 Signed





Impressions: 





 Service Date/Time:  Wednesday, May 9, 2018 13:31 - CONCLUSION:  1. Faint 





 chondrocalcinosis of the lateral menisci. 2. Otherwise negative. No fracture, 





 significant degenerative changes or effusion.     .     Telly Norton MD 


 


Hip and Pelvis X-Ray 18 0000 Signed





Impressions: 





 Service Date/Time:  Wednesday, May 9, 2018 13:29 - CONCLUSION:  1. Faint 





 heterogeneity of the axial skeleton as above. Subtle blastic metastasis cannot 





 be excluded. 2. Recommend CT scan of the pelvis without IV contrast for 

further 





 characterization.     Telly Norton MD 








 (Jose Garcia MD, R1)





Caprini VTE Risk Assessment


Caprini VTE Risk Assessment:  Mod/High Risk (score >= 2)


Caprini Risk Assessment Model











 Point Value = 1          Point Value = 2  Point Value = 3  Point Value = 5


 


Age 41-60


Minor surgery


BMI > 25 kg/m2


Swollen legs


Varicose veins


Pregnancy or postpartum


History of unexplained or recurrent


   spontaneous 


Oral contraceptives or hormone


   replacement


Sepsis (< 1 month)


Serious lung disease, including


   pneumonia (< 1 month)


Abnormal pulmonary function


Acute myocardial infarction


Congestive heart failure (< 1 month)


History of inflammatory bowel disease


Medical patient at bed rest Age 61-74


Arthroscopic surgery


Major open surgery (> 45 min)


Laparoscopic surgery (> 45 min)


Malignancy


Confined to bed (> 72 hours)


Immobilizing plaster cast


Central venous access Age >= 75


History of VTE


Family history of VTE


Factor V Leiden


Prothrombin 29862C


Lupus anticoagulant


Anticardiolipin antibodies


Elevated serum homocysteine


Heparin-induced thrombocytopenia


Other congenital or acquired


   thrombophilia Stroke (< 1 month)


Elective arthroplasty


Hip, pelvis, or leg fracture


Acute spinal cord injury (< 1 month)








Prophylaxis Regimen











   Total Risk


Factor Score Risk Level Prophylaxis Regimen


 


0-1      Low Early ambulation


 


2 Moderate Order ONE of the following:


*Sequential Compression Device (SCD)


*Heparin 5000 units SQ BID


 


3-4 Higher Order ONE of the following medications:


*Heparin 5000 units SQ TID


*Enoxaparin/Lovenox 40 mg SQ daily (WT < 150 kg, CrCl > 30 mL/min)


*Enoxaparin/Lovenox 30 mg SQ daily (WT < 150 kg, CrCl > 10-29 mL/min)


*Enoxaparin/Lovenox 30 mg SQ BID (WT < 150 kg, CrCl > 30 mL/min)


AND/OR


*Sequential Compression Device (SCD)


 


5 or more Highest Order ONE of the following medications:


*Heparin 5000 units SQ TID (Preferred with Epidurals)


*Enoxaparin/Lovenox 40 mg SQ daily (WT < 150 kg, CrCl > 30 mL/min)


*Enoxaparin/Lovenox 30 mg SQ daily (WT < 150 kg, CrCl > 10-29 mL/min)


*Enoxaparin/Lovenox 30 mg SQ BID (WT < 150 kg, CrCl > 30 mL/min)


AND


*Sequential Compression Device (SCD)








 (Jose Garcia MD, R1)





Assessment and Plan


Assessment and Plan


Patient is a 73-year-old female with past medical history of hypertension and 

CML (follows with Dr. Conway) presenting with:


Code Status


Full code


Discussed Condition With


Seen and discussed with Dr. Lancaster and Dr. Lira 


 (Jose Garcia MD, R1)


Attending Attestation


THIS CASE WAS DISCUSSED AND PATIENT WAS SEEN WITH THE RESIDENT PHYSICIANS. I 

HAVE REVIEWED THE RECORD AND AGREE WITH THE ABOVE NOTE, ATTEST THE ABOVE EXAM 

REFLECTS MY EXAM OF THE PATIENT AND AGREE WITH THE PLAN OF CARE AS WAS 

DISCUSSED. I HAVE AUTHORIZED THE ORDER FOR ADMISSION TO AN IN-PATIENT STATUS.


 (Nicolasa Lancaster MD)


Problem List:  


(1) Leg pain, left


ICD Codes:  M79.605 - Pain in left leg


Status:  Acute


Plan:  Patient with 6 day history of severe left leg pain (more pronounced on 

the left hip and left knee) affecting ambulation.  Pain began during 

chemotherapy treatment for CML last Friday.





Pain control:


[place] per pain scale


Dilaudid 0.4 milligrams IV every 3 for breakthrough pain


Toradol 30 mg IV 1 given





Imaging:


Chest x-ray normal


Left knee: No fractures, significant degenerative changes or effusion. 


Left hip/pelvis x-ray: Subtle heterogenicity of the axial skeleton more 

pronounced in the proximal femur in both his KI.  Osseous structures otherwise 

intact with no acute fracture.  Subtle metastasis cannot be excluded. 

recommendations of CT scan of pelvis without contrast for further evaluation.





Follow-up


CT pelvis





(2) CML (chronic myelocytic leukemia)


ICD Codes:  C92.10 - Chronic myeloid leukemia, BCR/ABL-positive, not having 

achieved remission


Status:  Chronic


Plan:  Patient follows with Dr. Conway for CML


Currently undergoing second round of chemotherapy (Acetabine)


Medical oncology consulted (Dr. Conway), appreciate recommendations


Discussed case with Dr. Conway, he is concerned the patient may have meningeal 

disease causing bone pain as he has a similar patient case in the past.


CT head: No acute findings





See plan above





(3) Hypertension


ICD Codes:  I10 - Essential (primary) hypertension


Status:  Chronic


Plan:  Patient found to be have elevated blood pressures in the ED.


Patient reports she took enalapril this morning


Continue with home medication


Clonidine as needed for hypertension protocol


Continue to monitor





(4) Elevated troponin


ICD Codes:  R74.8 - Abnormal levels of other serum enzymes


Status:  Acute


Plan:  In the ED patient was found to have elevated troponin of 0.14. 


Patient denies any active chest pain, shortness of breath, or palpitations.


EKG normal, per medical team read





(5) Nutrition, metabolism, and development symptoms


ICD Codes:  R63.8 - Other symptoms and signs concerning food and fluid intake


Plan:  Fluids: Tolerating p.o.


Electrolytes: Within normal limits, replete as needed


Nutrition: Regular diet:





DVT prophylaxis: SCDs


 (Jose Garcia MD, R1)


Problem List:  


(1) Leg pain, left


ICD Codes:  M79.605 - Pain in left leg


Status:  Acute


Plan:  Patient with 6 day history of severe left leg pain (more pronounced on 

the left hip and left knee) affecting ambulation.  Pain began during 

chemotherapy treatment for CML last Friday.





Pain control:


[place] per pain scale


Dilaudid 0.4 milligrams IV every 3 for breakthrough pain


Toradol 30 mg IV 1 given





Imaging:


Chest x-ray normal


Left knee: No fractures, significant degenerative changes or effusion. 


Left hip/pelvis x-ray: Subtle heterogenicity of the axial skeleton more 

pronounced in the proximal femur in both his KI.  Osseous structures otherwise 

intact with no acute fracture.  Subtle metastasis cannot be excluded. 

recommendations of CT scan of pelvis without contrast for further evaluation.





Follow-up


CT pelvis





(2) CML (chronic myelocytic leukemia)


ICD Codes:  C92.10 - Chronic myeloid leukemia, BCR/ABL-positive, not having 

achieved remission


Status:  Chronic


Plan:  Patient follows with Dr. Conway for CML


Currently undergoing second round of chemotherapy (Acetabine)


Medical oncology consulted (Dr. Conway), appreciate recommendations


Discussed case with Dr. Conway, he is concerned the patient may have meningeal 

disease causing bone pain as he has a similar patient case in the past.


CT head: No acute findings





See plan above





(3) Hypertension


ICD Codes:  I10 - Essential (primary) hypertension


Status:  Chronic


Plan:  Patient found to be have elevated blood pressures in the ED.


Patient reports she took enalapril this morning


Continue with home medication


Clonidine as needed for hypertension protocol


Continue to monitor





(4) Elevated troponin


ICD Codes:  R74.8 - Abnormal levels of other serum enzymes


Status:  Acute


Plan:  In the ED patient was found to have elevated troponin of 0.14. 


Patient denies any active chest pain, shortness of breath, or palpitations.


EKG normal, per medical team read





(5) Nutrition, metabolism, and development symptoms


ICD Codes:  R63.8 - Other symptoms and signs concerning food and fluid intake


Plan:  Fluids: Tolerating p.o.


Electrolytes: Within normal limits, replete as needed


Nutrition: Regular diet:





DVT prophylaxis: SCDs


 (Nicolasa Lancaster MD)





Problem Qualifiers





(1) Hypertension:  


Qualified Codes:  I10 - Essential (primary) hypertension








Jose Garcia MD, R1 May 9, 2018 12:24


Nicolasa Lancaster MD May 10, 2018 11:56

## 2018-05-09 NOTE — RADRPT
EXAM DATE/TIME:  05/09/2018 21:10 

 

[facility] COMPARISON:     

CT PELVIS W/O CONTRAST, May 09, 2018, 21:10.  KNEE LEFT COMPLETE (4VWS), May 09, 2018, 13:31.

 

 

INDICATIONS :     

Pain in left femur and pelvis

                      

 

RADIATION DOSE:     

11.61 CTDIvol (mGy) 

 

 

MEDICAL HISTORY :     

Hypertension.   Leukemia, asthma

 

SURGICAL HISTORY :      

Hysterectomy.   

 

ENCOUNTER:      

Subsequent

 

ACUITY:      

1 day

 

PAIN SCALE:      

4/10

 

LOCATION:       

Left  Femur

 

TECHNIQUE:     

Volumetric scanning of the femur was performed.  Using automated exposure control and adjustment of t
he mA and/or kV according to patient size, radiation dose was kept as low as reasonably achievable to
 obtain optimal diagnostic quality images.   DICOM format image data is available electronically for 
review and comparison.  

 

FINDINGS:     

 

BONES:     

There are numerous small sclerotic lesions seen throughout the visualized pelvis and at the proximal 
left femur. No evidence of fracture.  Alignment is within normal limits. On the  image, there is
 thick cortex at the distal femoral shaft with central calcification. This is incompletely evaluated 
on this left hip CT examination. 

 

JOINTS:     

No evidence of joint narrowing or effusion.

 

SOFT TISSUES:     

Muscles, tendons, and neurovascular structures are grossly unremarkable. No evidence of mass, organiz
ed fluid collection, or foreign body. Sigmoid colon diverticula are seen.

 

CONCLUSION:     

1. Numerous small sclerotic foci seen throughout the visualized structures in the left pelvis in the 
left proximal femur. These could be from sclerotic bone lesions/metastatic disease. Multiple bone isl
ands and osteopoikilosis could have a similar appearance.

2. Cortical thickening at the medial distal femoral shaft with central calcification. This could be f
rom a prior injury. This is incompletely evaluated on this left hip CT examination. This is only seen
 on the  image. This finding should be correlated with any prior history.

 

 

 

 Wiley Ricci MD on May 09, 2018 at 21:56           

Board Certified Radiologist.

 This report was verified electronically.

## 2018-05-09 NOTE — RADRPT
EXAM DATE/TIME:  05/09/2018 10:18 

 

[facility] COMPARISON:     

No previous studies available for comparison.

 

 

INDICATIONS :     

Cephalgia.

                      

 

RADIATION DOSE:     

56.35 CTDIvol (mGy) 

 

 

 

MEDICAL HISTORY :     

Leukemia. Hypertension. Asthma.

 

SURGICAL HISTORY :      

Hysterectomy. 

 

ENCOUNTER:      

Initial

 

ACUITY:      

1 day

 

PAIN SCALE:      

3/10

 

LOCATION:       

Bilateral cranial 

 

TECHNIQUE:     

Multiple contiguous axial images were obtained of the head.  Using automated exposure control and adj
ustment of the mA and/or kV according to patient size, radiation dose was kept as low as reasonably a
chievable to obtain optimal diagnostic quality images.   DICOM format image data is available electro
nically for review and comparison.  

 

FINDINGS:     

 

CEREBRUM:     

The ventricles are normal for age.  Mild, symmetric cortical atrophy. No evidence of midline shift, m
ass lesion, hemorrhage or acute infarction.  Minimal periventricular white matter changes with a foca
l lacunar type infarct in the right frontal parietal watershed area. No extra-axial fluid collections
 are seen.

 

POSTERIOR FOSSA:     

The cerebellum and brainstem are intact.  The 4th ventricle is midline.  The cerebellopontine angle i
s unremarkable.

 

EXTRACRANIAL:     

The visualized portion of the orbits is intact.

 

SKULL:     

The calvaria is intact.  No evidence of skull fracture.

 

CONCLUSION:     

1. Chronic changes of mild symmetric cortical atrophy and minimal white matter changes.

2. Nothing acute.

 

 

 

 Telly Norton MD on May 09, 2018 at 10:52           

Board Certified Radiologist.

 This report was verified electronically.

## 2018-05-09 NOTE — RADRPT
EXAM DATE/TIME:  05/09/2018 10:06 

 

[facility] COMPARISON:     

CHEST SINGLE AP, April 11, 2018, 23:55.

 

                     

INDICATIONS :     

Evaluate for infiltrate.

                     

 

MEDICAL HISTORY :            

Hypertension. Leukemia.   

 

SURGICAL HISTORY :     

Hysterectomy.   

 

ENCOUNTER:     

Initial                                        

 

ACUITY:     

1 day      

 

PAIN SCORE:     

0/10

 

LOCATION:     

Bilateral chest 

 

FINDINGS:     

A single view of the chest demonstrates the lungs to be symmetrically aerated without evidence of mas
s, infiltrate or effusion.  The cardiomediastinal contours are unremarkable.  Osseous structures are 
intact with an S-shaped scoliosis of the thoracolumbar spine.

 

CONCLUSION:     

No acute cardiopulmonary process.

 

 

 

 Telly Norton MD on May 09, 2018 at 10:50           

Board Certified Radiologist.

 This report was verified electronically.

## 2018-05-09 NOTE — RADRPT
EXAM DATE/TIME:  05/09/2018 13:31 

 

[facility] COMPARISON:     

No previous studies available for comparison.

 

                     

INDICATIONS :     

Left knee pain no known injury.

                     

 

MEDICAL HISTORY :            

Hypertension. Leukemia.   

 

SURGICAL HISTORY :     

Hysterectomy.   

 

ENCOUNTER:     

Subsequent                                        

 

ACUITY:     

1 day      

 

PAIN SCORE:     

7/10

 

LOCATION:     

Left  Knee.

 

FINDINGS:     

Four view examination of the left knee demonstrates no evidence of fracture or dislocation.  Bony min
eralization is normal.  The articular surfaces are intact.  The suprapatellar soft tissues have a nor
mal configuration. Faint chondrocalcinosis of the lateral menisci.

 

CONCLUSION:

1. Faint chondrocalcinosis of the lateral menisci.

2. Otherwise negative. No fracture, significant degenerative changes or effusion.     .

 

 

 

 Telly Norton MD on May 09, 2018 at 14:35           

Board Certified Radiologist.

 This report was verified electronically.

## 2018-05-09 NOTE — RADRPT
EXAM DATE/TIME:  05/09/2018 21:10 

 

[facility] COMPARISON:     

KNEE LEFT COMPLETE (4VWS), May 09, 2018, 13:31.  CT BRAIN W/O CONTRAST, May 09, 2018, 10:18.  HIP LEF
T (AP&LAT 2/3VWS) W AP PELVIS, May 09, 2018, 13:29.

 

 

INDICATIONS :     

Pain in left femur and pelvis

                      

 

ORAL CONTRAST:      

No oral contrast ingested.

                      

 

RADIATION DOSE:     

11.94 CTDIvol (mGy) 

 

 

MEDICAL HISTORY :     

Hypertension.  Leukemia, asthma

 

SURGICAL HISTORY :      

Hysterectomy. 

 

ENCOUNTER:      

Subsequent

 

ACUITY:      

1 day

 

PAIN SCALE:      

4/10

 

LOCATION:       

Left pelvis 

 

TECHNIQUE:     

Volumetric scanning of the pelvis was performed.  Using automated exposure control and adjustment of 
the mA and/or kV according to patient size, radiation dose was kept as low as reasonably achievable t
o obtain optimal diagnostic quality images.   DICOM format image data is available electronically for
 review and comparison.  

 

FINDINGS:     

 

BOWEL/MESENTERY:     

There are diverticula seen in the sigmoid colon.

 

BLADDER:     

There is no wall thickening or mass.

 

RETROPERITONEUM:     

There is no aneurysm or lymphadenopathy. 

 

REPRODUCTIVE:     

The patient is status post hysterectomy.

 

INGUINAL:     

There is no lymphadenopathy or hernia. 

 

MUSCULOSKELETAL:     

There are numerous sclerotic lesions seen throughout all the visualized bony structures. There is deg
enerative change with disc space narrowing at the L5-S1 level.

 

CONCLUSION:     

Widespread small sclerotic lesions throughout all the visualized bony structures concerning for wides
pread metastatic disease. Innumerable bone islands and osteopoikilosis could have a similar appearanc
e but metastatic lesions are more common.

 

 

 

 Wiley Ricci MD on May 09, 2018 at 21:45           

Board Certified Radiologist.

 This report was verified electronically.

## 2018-05-09 NOTE — PD
HPI


Chief Complaint:  Allergic/Adverse Reaction


Time Seen by Provider:  09:38


Travel History


International Travel<30 days:  No


Contact w/Intl Traveler<30days:  No


Traveled to known affect area:  No





History of Present Illness


HPI


Patient comes in and as a oncology patient of Dr. olesya CONWAY, who has started 

this patient on a chemo drug called ACETABINE (SP), since beginning on this 

drug and the patient has had over the past 5 days increasing body aches 

inability to walk secondary to the pain and Dr. Conway was concerned that this 

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  The patient was 

sent over to the emergency department for initial evaluation and possible 

admission.





Allergy to sulfa, penicillin, polymyxin, bacitracin,


Past medical history significant for cataract surgery, hypertension, asthma, 

hysterectomy, claustrophobia, leukemia CML type





PFSH


Past Medical History


Asthma:  Yes (childhood)


Heart Rhythm Problems:  No


Cancer:  Yes (LEUKEMIA)


Cardiac Catheterization:  No


Cardiovascular Problems:  Yes


High Cholesterol:  No


Chemotherapy:  Yes


Chest Pain:  No


Congestive Heart Failure:  No


COPD:  No


Diabetes:  No


Diminished Hearing:  No


Endocrine:  No


Genitourinary:  No


Hypertension:  Yes


Immune Disorder:  No


Musculoskeletal:  No


Neurologic:  No


Psychiatric:  No


Reproductive:  No


Respiratory:  Yes


Myocardial Infarction:  No


Radiation Therapy:  No


Sickle Cell Disease:  No


Sleep Apnea:  No


Thyroid Disease:  No


Tetanus Vaccination:  Unknown


Influenza Vaccination:  No


Pregnant?:  Not Pregnant





Past Surgical History


Abdominal Surgery:  No


AICD:  No


Arteriovenous Shunt:  No


Cardiac Surgery:  No


Coronary Artery Bypass Graft:  No


Ear Surgery:  No


Endocrine Surgery:  No


Eye Surgery:  Yes (cataract sx, RK eye surgery)


Genitourinary Surgery:  No


Insulin Pump:  No


Joint Replacement:  Yes (b/l feet)


Oral Surgery:  No


Pacemaker:  No


Thoracic Surgery:  No


Other Surgery:  Yes





Family History


Family Myocardial Infarction:  Yes (MOTHER  OF MI & FATHER S MOTHER  OF 

MI)





Social History


Alcohol Use:  Yes (on occasion)


Tobacco Use:  No


Substance Use:  No





Allergies-Medications


(Allergen,Severity, Reaction):  


Coded Allergies:  


     Sulfa (Sulfonamide Antibiotics) (Unverified  Allergy, Intermediate, Fever 

and Rash, 18)


     penicillin G (Unverified  Allergy, Intermediate, Rash, 18)


     bacitracin (Unverified  Allergy, Mild, Rash, 18)


     polymyxin B (Unverified  Allergy, Mild, Rash, 18)


Reported Meds & Prescriptions





Reported Meds & Active Scripts


Active


Enalapril (Enalapril Maleate) 10 Mg Tab 10 Mg PO DAILY


Reported


One Daily Multivitamin (Multivitamin) 1 Each Tablet 1 Tab PO DAILY


Vitamin C (Ascorbic Acid) 1,000 Mg Tablet.er 1 Tab PO DAILY


Milk Thistle Unknown Strength Cap Unknown Dose PO DAILY


K2 Plus D3 100-1000 Mcg-Unit (Vitamin D & K) 1,000 Unit-100 Mcg Tab 1 Tab PO 

DAILY


Vitamin D3 (Cholecalciferol) 5,000 Unit Cap 5,000 Units PO DAILY


Prednisone 20 Mg Tab 20 Mg PO DAILY


Omeprazole 20 Mg Tab 20 Mg PO BID


Menest (Estrogens, Esterified) 0.625 Mg Tab 0.625 Mg PO DAILY








Review of Systems


General / Constitutional:  No: Fever


Eyes:  No: Visual changes


HENT:  No: Headaches


Cardiovascular:  No: Chest Pain or Discomfort


Respiratory:  No: Shortness of Breath


Gastrointestinal:  No: Abdominal Pain


Genitourinary:  No: Dysuria


Musculoskeletal:  Positive: Myalgias


Skin:  No Rash


Neurologic:  Positive: Weakness


Psychiatric:  No: Depression


Endocrine:  No: Polydipsia


Hematologic/Lymphatic:  No: Easy Bruising





Physical Exam


Narrative


GENERAL: Thin elderly white female, some cachexia


SKIN: Warm and dry.


HEAD: Atraumatic. Normocephalic. 


EYES: Pupils equal and round. No scleral icterus. No injection or drainage. 


ENT: No nasal bleeding or discharge.  Mucous membranes pink and moist.


NECK: Trachea midline. No JVD. 


CARDIOVASCULAR: Regular rate and rhythm.  


RESPIRATORY: No accessory muscle use. Clear to auscultation. Breath sounds 

equal bilaterally. 


GASTROINTESTINAL: Abdomen soft, non-tender, nondistended. 


MUSCULOSKELETAL: Extremities without clubbing, cyanosis, or edema. No obvious 

deformities. 


NEUROLOGICAL: Awake and alert. No obvious cranial nerve deficits.  Motor 

grossly within normal limits. Five out of 5 muscle strength in the arms and 

legs.  Normal speech.


PSYCHIATRIC: Appropriate mood and affect; insight and judgment normal.





Data


Data


Last Documented VS





Vital Signs








  Date Time  Temp Pulse Resp B/P (MAP) Pulse Ox O2 Delivery O2 Flow Rate FiO2


 


18 11:00  91 19 180/82 (114) 97 Room Air  


 


18 09:28 99.3       








Orders





 Orders


Complete Blood Count With Diff (18 09:50)


Comprehensive Metabolic Panel (18 09:50)


Creatine Kinase (Cpk) (18 09:50)


Ckmb (Isoenzyme) Profile (18 09:50)


Troponin I (18 09:50)


B-Type Natriuretic Peptide (18 09:50)


Prothrombin Time / Inr (Pt) (18 09:50)


Act Partial Throm Time (Ptt) (18 09:50)


Lipase (18 09:50)


Urinalysis - C+S If Indicated (18 09:50)


Magnesium (Mg) (18 09:50)


Thyroid Stimulating Hormone (18 09:50)


Csf Cell Count + Differential (18 09:50)


Glucose, Csf (18 09:50)


Total Protein, Csf (18 09:50)


Chest, Single Ap (18 09:50)


Ct Brain W/O Iv Contrast(Rout) (18 09:50)


Iv Access Insert/Monitor (18 09:50)


Ecg Monitoring (18 09:50)


Oximetry (18 09:50)


Platelet Pheresis (18 09:50)





Labs





Laboratory Tests








Test


  18


10:15


 


White Blood Count 11.8 TH/MM3 


 


Red Blood Count 2.76 MIL/MM3 


 


Hemoglobin 7.9 GM/DL 


 


Hematocrit 23.6 % 


 


Mean Corpuscular Volume 85.2 FL 


 


Mean Corpuscular Hemoglobin 28.5 PG 


 


Mean Corpuscular Hemoglobin


Concent 33.5 % 


 


 


Red Cell Distribution Width 23.9 % 


 


Platelet Count 69 TH/MM3 


 


Mean Platelet Volume 11.1 FL 


 


CBC Comment AUTO DIFF 


 


Differential Total Cells


Counted 100 


 


 


Neutrophils % (Manual) 54 % 


 


Band Neutrophils % 10 % 


 


Lymphocytes % 16 % 


 


Monocytes % 8 % 


 


Eosinophils % 1 % 


 


Neutrophils # (Manual) 8.9 TH/MM3 


 


Metamyelocytes 10 % 


 


Myelocytes 1 % 


 


Differential Comment


  FINAL DIFF


MANUAL


 


Platelet Estimate LOW 


 


Platelet Morphology Comment NORMAL 


 


Tear Drop Cells 2+ 


 


Ovalocytes 1+ 


 


Keratocytes OCC 


 


Prothrombin Time 10.6 SEC 


 


Prothromb Time International


Ratio 1.0 RATIO 


 


 


Activated Partial


Thromboplast Time 24.9 SEC 


 


 


Blood Urea Nitrogen 13 MG/DL 


 


Creatinine 0.73 MG/DL 


 


Random Glucose 115 MG/DL 


 


Total Protein 7.0 GM/DL 


 


Albumin 3.8 GM/DL 


 


Calcium Level 9.0 MG/DL 


 


Magnesium Level 2.0 MG/DL 


 


Alkaline Phosphatase 544 U/L 


 


Aspartate Amino Transf


(AST/SGOT) 48 U/L 


 


 


Alanine Aminotransferase


(ALT/SGPT) 44 U/L 


 


 


Total Bilirubin 0.8 MG/DL 


 


Sodium Level 141 MEQ/L 


 


Potassium Level 4.1 MEQ/L 


 


Chloride Level 106 MEQ/L 


 


Carbon Dioxide Level 24.7 MEQ/L 


 


Anion Gap 10 MEQ/L 


 


Estimat Glomerular Filtration


Rate 78 ML/MIN 


 


 


Total Creatine Kinase 22 U/L 


 


Troponin I 0.14 NG/ML 


 


B-Type Natriuretic Peptide 136 PG/ML 


 


Lipase 181 U/L 


 


Thyroid Stimulating Hormone


3rd Gen 0.535 uIU/ML 


 











MDM


Medical Decision Making


Medical Screen Exam Complete:  Yes


Emergency Medical Condition:  Yes


Medical Record Reviewed:  Yes


Differential Diagnosis


Pneumonia versus UTI versus electrolyte imbalance versus anemia versus 

dehydration versus non-STEMI


Narrative Course


Coagulation profile is within normal


11.8 WBC, 7.9/23.6, 69,000 platelet count, no left shift, however there are 

metamyelocytes teardrop cells of ovalocytes keratinocytes as expected for this 

patient's leukemia


Electrolytes are all within normal limits with a normal kidney, and pancreatic 

functions.  TSH screening test is 0.535 on the low end but still in the normal 

range.


First set of cardiac enzymes are positive for a troponin of 0.14


Beta natruretic peptide is 136


Chest x-ray is read by radiologist as no acute cardiopulmonary process


CT head is read by radiologist as chronic changes of mild symmetric cortical 

atrophy and minimal white matter changes without acute finding





Diagnosis





 Primary Impression:  


 Elevated troponin


 Additional Impressions:  


 Generalized weakness


 Anemia











Primo Hickman MD May 9, 2018 10:15

## 2018-05-09 NOTE — MB
cc:

Arturo Conway MD, Richard MD

****

 

 

DATE:

05/09/2018

 

REASON FOR CONSULTATION:

A patient with chronic myelomonocytic leukemia, admitted to the 

hospital at my request due to severe uncontrolled pain following 

decitabine chemotherapy.

 

PATIENT PROFILE:

The patient is a 73-year-old female.  She is .  She has a male

.  She has been  3 times in the past.  She has a son, 

age 51, who is brain damaged and lives in an institution.  The patient

was born in [place]. She has lived in [place] since 1955.  She is a 

retired  and worked for the [employer].  When she was well, she 

would have 2 drinks per day.  In the past when she was not ill, she 

enjoyed  sailing reading, traveling, [email or web address] and bridge.

 

HISTORY OF PRESENT ILLNESS:

The patient is a 73-year-old female who had an elevated monocyte count

for several years.  I saw her for the first time on 12/20/2017.  She 

had developed a rising monocyte count and was referred to me by Dr. Tyrone Beauchamp.  On 11/14/2017, she had a hemoglobin of 12.5, white 

count 18,000, 43% monocytes.  She was felt to have chronic 

myelomonocytic leukemia after a bone marrow evaluation and routine 

chromosomes.  She had a rapidly rising monocyte count.  In between 

01/10/2018 and 02/20, the white count anu to 103,000 with a 

hemoglobin of 12.5,  platelet count of 102,000.  She was referred to 

the [facility] for a possible bone marrow transplant.  She 

had 3 or 4 siblings who did not match.  She has one remaining sibling 

who has been tested and it has not been determined whether she is or 

is not a match for a bone marrow transplant.

 

The patient was initially treated with hydroxyurea, which successfully

controlled her counts without untoward problems.  It was recommended 

that she receive decitabine.  Her second cycle of decitabine was 

administered last week, Monday through Friday.

 

Towards the end of last week, she started to develop diffuse bone 

pain.  She tried Advil, the pain worsened and I gave her several days 

of steroids.  I saw her earlier this week and she had deteriorated.  

She was having diffuse pain in the pelvis, femurs, upper chest area.  

She was barely able to walk.  She was given IV steroids and placed on 

oral prednisone approximately 48 hours ago.  Today, she arrived at the

clinic for a followup visit and when she arrived, she was in severe 

pain, weak and required a wheelchair to get to the examining room.  My

nurse referred her to the emergency room because of this continued 

deterioration and our inability to control her symptoms or even 

understand why they were taking place.

 

She is now admitted from the emergency room at my request, as I do not

have a great deal of insight into why she is in such severe pain.

 

When I am seeing her this evening, she tells me that the pain in the 

upper chest area that she had 24 hours ago has subsided.  She has pain

in the pelvic area, more on the left than the right, and pain in the 

left femur.  She is actually doing better this evening.  Whether it is

related to the steroids that she received or simply that she is 

improving is unclear.

 

My initial inclination was to do a spinal tap, as she has chronic 

myelomonocytic leukemia and there is the potential for transformation 

of monocytes into monoblasts and they can have a propensity for 

seeding the spinal fluid.  This evening it is clear that she is 

doing better.  She only has pain.  She does not have any focal 

weakness and the spinal tap can be held.

 

A CMP was performed and it is notable for a rising alkaline 

phosphatase which is 544 with an AST of 48 and an ALT of 44, 

suggesting that this arises from bone.  On 04/17, the alkaline 

phosphatase was 191 and  on 04/13 it was 128.  Troponin is 0.14.  

Chest x-ray shows no acute cardiopulmonary disease.  Head CT scan 

shows mild symmetric cortical atrophy and minimal white matter 

changes.  There is nothing acute.  An x-ray of the hip and pelvis 

shows faint heterogeneity of the axial skeleton.  Subtle blastic 

metastases cannot be excluded and aCAT scan of the pelvis 

without IV contrast was recommended.   X-ray of the knee showed 
chondrocalcinosis, 

otherwise unremarkable.

 

At the present time, her major pain is in the left pelvis and  left femur.  She 

is exhausted.  She has had very little sleep.  She is anxious and 

scared.

 

PAST SURGICAL HISTORY:

1.  Comminuted fracture of right femur following skiing accident 

requiring plate and 17 screws performed in [place], [place] with 

removal of hardware in 1984.

2.  Partial hysterectomy in 1997.  Ovaries removed.

3.  Mammoplasty with breast implants, which leaked, were removed and 

were replaced in 1989.

4.  Colonoscopy in 2013 with Dr. Moulis, benign polyps removed.

5.  Plastic surgery in 1989.

6.  Tubal ligation.

 

PAST MEDICAL HISTORY:

1.  Chronic myelomonocytic leukemia.

2.  Hypertension.

3.  Irritable bowel syndrome.

4.  History of recurrent hematuria.

5.  Diverticulosis.

 

ALLERGIES:

1.  BACITRACIN.

2.  PENICILLIN.

3.  POLYMYXIN B.

4.  SULFA ANTIBIOTIC.

5.  ALTABAX

 

MEDICATIONS PRIOR TO ADMISSION:

1.  She started prednisone 20 mg twice a day yesterday.

2.  Vitamin D3.

3.  Vasotec 10 mg a day

4.  Estrogen replacement.

5.  Omeprazole.

 

FAMILY HISTORY:

Noncontributory.

 

REVIEW OF SYSTEMS:

No change in vision or hearing.  She has had discomfort over her 

anterior chest area, but it has been mostly bone pain.

RESPIRATORY:  No shortness of breath.

GI:  Constipation.  No bowel movement in at least a day if not longer.

GENITOURINARY:  No dysuria or frequency.

MUSCULOSKELETAL:  Diffuse bone pain which is beginning to subside 

primarily now involving pelvis, left side more than right, and left 

femur.

NEUROLOGIC:  No focal weakness.

PSYCHIATRIC:  Anxiety, depression.

SKIN:  No problems.

 

PHYSICAL EXAMINATION:

GENERAL:  Reveals an anxious, tired female, looking younger than 

stated age.

VITAL SIGNS:  Blood pressure is 170/80, respiratory rate 16, pulse 92,

afebrile.  O2 saturation 97%.

HEENT:  Head is normocephalic.  Sclerae and conjunctivae normal.  

Oropharynx unremarkable.

NECK:  No adenopathy.

HEART:  Regular rhythm.

LUNGS:  Clear.

ABDOMEN:  Soft.  No enlargement of liver or spleen. No masses.  No 

tenderness.

EXTREMITIES:  No edema.

MUSCULOSKELETAL:  Some mild tenderness left lateral hip area and left 

femur.

NEUROLOGIC:  No focal weakness.

PSYCHIATRIC:  Understandable anxiety.

 

ASSESSMENT AND PLAN:

The patient is a 73-year-old female.  She has a very aggressive form 

of chronic myelomonocytic leukemia.  She needs a bone marrow 

transplant.  She received her second cycle of decitabine last week 

completed on Friday.  During this decitabine and following this, she 

developed severe bone pain.  Decitabine can cause pain, but the depth 

of the pain is entirely unexpected and the fact that it would 

persist when her chemotherapy was completed last Friday is difficult to 

explain.  She deteriorated to the point where I felt that I could 

no longer manage her as an outpatient.  She received steroids, IV

narcotics and IV fluids in the office yesterday and today when she 

arrived, she was no better.  This afternoon, she appears to be a 

little better.

 

PLAN:

1.  At the present time, I do not think she needs a spinal tap.  Some 

of her symptoms are resolving and I think it is highly unlikely that 

we are dealing with a leukemic meningitis.   This will still be 

consideration, if her condition worsens.  In addition, she is 

terrorized about having a spinal tap.

2.  Await results of the CAT scan of the pelvis and I have also 

requested a CAT scan of the femur.  I anticipate that she will have an

abnormal marrow on the CAT scan as her chronic myelomonocytic leukemia

is a disease of the marrow.  She should not have destructive lesions 

of bone.

3.  Regarding pain, medicines have been written.  I am going to

resume the prednisone as there may have been some relief from the 

steroids, although this is not clear.

4.  Repeat CBC tomorrow and repeat alkaline phosphatase, as I do not 

understand why the alkaline phosphatase increased so dramatically over

the past several days and I believe this is secondary to bone involvement

as the liver function tests are for the most part normal.

 

I appreciate the help from the hospitalist and the emergency room, as 

this has become a totally unmanageable situation in the outpatient 

setting with daily visits and incapacitating pain and no direction.

 

 

__________________________________

MD OSCAR Mendoza/

D: 05/09/2018, 08:38 PM

T: 05/09/2018, 09:27 PM

Visit #: [id number]

Job #: [id number]

KANDACE

## 2018-05-10 VITALS
TEMPERATURE: 98 F | HEART RATE: 111 BPM | SYSTOLIC BLOOD PRESSURE: 191 MMHG | DIASTOLIC BLOOD PRESSURE: 100 MMHG | OXYGEN SATURATION: 100 % | RESPIRATION RATE: 16 BRPM

## 2018-05-10 VITALS — SYSTOLIC BLOOD PRESSURE: 192 MMHG | DIASTOLIC BLOOD PRESSURE: 100 MMHG

## 2018-05-10 VITALS
HEART RATE: 117 BPM | TEMPERATURE: 98.5 F | RESPIRATION RATE: 22 BRPM | SYSTOLIC BLOOD PRESSURE: 190 MMHG | OXYGEN SATURATION: 98 % | DIASTOLIC BLOOD PRESSURE: 112 MMHG

## 2018-05-10 VITALS
HEART RATE: 95 BPM | SYSTOLIC BLOOD PRESSURE: 185 MMHG | OXYGEN SATURATION: 97 % | DIASTOLIC BLOOD PRESSURE: 96 MMHG | TEMPERATURE: 98.1 F | RESPIRATION RATE: 22 BRPM

## 2018-05-10 VITALS
SYSTOLIC BLOOD PRESSURE: 191 MMHG | RESPIRATION RATE: 18 BRPM | DIASTOLIC BLOOD PRESSURE: 99 MMHG | TEMPERATURE: 97.9 F | OXYGEN SATURATION: 97 % | HEART RATE: 94 BPM

## 2018-05-10 VITALS
HEART RATE: 92 BPM | TEMPERATURE: 98.4 F | OXYGEN SATURATION: 97 % | DIASTOLIC BLOOD PRESSURE: 107 MMHG | SYSTOLIC BLOOD PRESSURE: 197 MMHG | RESPIRATION RATE: 18 BRPM

## 2018-05-10 VITALS — HEART RATE: 100 BPM

## 2018-05-10 VITALS — DIASTOLIC BLOOD PRESSURE: 100 MMHG | SYSTOLIC BLOOD PRESSURE: 182 MMHG

## 2018-05-10 LAB
ALBUMIN SERPL-MCNC: 4 GM/DL (ref 3.4–5)
ALP SERPL-CCNC: 755 U/L (ref 45–117)
ALT SERPL-CCNC: 56 U/L (ref 10–53)
AST SERPL-CCNC: 80 U/L (ref 15–37)
BILIRUB SERPL-MCNC: 1.1 MG/DL (ref 0.2–1)
BLASTS NFR BLD: 1 % (ref 0–0)
BUN SERPL-MCNC: 14 MG/DL (ref 7–18)
CALCIUM SERPL-MCNC: 8.7 MG/DL (ref 8.5–10.1)
CHLORIDE SERPL-SCNC: 100 MEQ/L (ref 98–107)
CREAT SERPL-MCNC: 0.72 MG/DL (ref 0.5–1)
DACRYOCYTES BLD QL SMEAR: (no result)
ERYTHROCYTE [DISTWIDTH] IN BLOOD BY AUTOMATED COUNT: 25 % (ref 11.6–17.2)
GFR SERPLBLD BASED ON 1.73 SQ M-ARVRAT: 80 ML/MIN (ref 89–?)
GLUCOSE SERPL-MCNC: 108 MG/DL (ref 74–106)
HCO3 BLD-SCNC: 27.7 MEQ/L (ref 21–32)
HCT VFR BLD CALC: 26.4 % (ref 35–46)
HGB BLD-MCNC: 9 GM/DL (ref 11.6–15.3)
LYMPHOCYTES: 28 % (ref 9–44)
MCH RBC QN AUTO: 28.7 PG (ref 27–34)
MCHC RBC AUTO-ENTMCNC: 33.9 % (ref 32–36)
MCV RBC AUTO: 84.8 FL (ref 80–100)
METAMYELOCYTES NFR BLD: 2 % (ref 0–1)
MONOCYTES: 17 % (ref 0–8)
MYELOCYTES NFR BLD: 3 % (ref 0–0)
NEUTS BAND # BLD MANUAL: 10.2 TH/MM3 (ref 1.8–7.7)
NEUTS BAND NFR BLD: 14 % (ref 0–6)
NEUTS SEG NFR BLD MANUAL: 34 % (ref 16–70)
NUCLEATED RED BLOOD CELL: 2 (ref 0–0)
OVALOCYTES BLD QL SMEAR: (no result)
PLATELET # BLD: 57 TH/MM3 (ref 150–450)
PMV BLD AUTO: 9 FL (ref 7–11)
PROMYELOCYTES NFR BLD: 1 % (ref 0–0)
PROT SERPL-MCNC: 7.1 GM/DL (ref 6.4–8.2)
RBC # BLD AUTO: 3.12 MIL/MM3 (ref 4–5.3)
SODIUM SERPL-SCNC: 139 MEQ/L (ref 136–145)
WBC # BLD AUTO: 18.9 TH/MM3 (ref 4–11)
WBC NRBC COR # BLD: 2 /100 WBC (ref 0–0)

## 2018-05-10 RX ADMIN — HYDROMORPHONE HYDROCHLORIDE PRN MG: 1 INJECTION, SOLUTION INTRAMUSCULAR; INTRAVENOUS; SUBCUTANEOUS at 13:45

## 2018-05-10 RX ADMIN — ONDANSETRON PRN MG: 4 TABLET, ORALLY DISINTEGRATING ORAL at 10:47

## 2018-05-10 RX ADMIN — PHENYTOIN SODIUM SCH MLS/HR: 50 INJECTION INTRAMUSCULAR; INTRAVENOUS at 15:16

## 2018-05-10 RX ADMIN — ENALAPRILAT PRN MG: 1.25 INJECTION, SOLUTION INTRAVENOUS at 04:21

## 2018-05-10 RX ADMIN — ENALAPRILAT PRN MG: 1.25 INJECTION, SOLUTION INTRAVENOUS at 11:37

## 2018-05-10 RX ADMIN — STANDARDIZED SENNA CONCENTRATE AND DOCUSATE SODIUM SCH TAB: 8.6; 5 TABLET, FILM COATED ORAL at 09:08

## 2018-05-10 RX ADMIN — HYDROCODONE BITARTRATE AND ACETAMINOPHEN PRN TAB: 10; 325 TABLET ORAL at 01:09

## 2018-05-10 RX ADMIN — PANTOPRAZOLE SCH MG: 40 TABLET, DELAYED RELEASE ORAL at 09:08

## 2018-05-10 RX ADMIN — HYDROCODONE BITARTRATE AND ACETAMINOPHEN PRN TAB: 10; 325 TABLET ORAL at 09:09

## 2018-05-10 RX ADMIN — ENALAPRILAT PRN MG: 1.25 INJECTION, SOLUTION INTRAVENOUS at 17:43

## 2018-05-10 RX ADMIN — STANDARDIZED SENNA CONCENTRATE AND DOCUSATE SODIUM SCH TAB: 8.6; 5 TABLET, FILM COATED ORAL at 20:48

## 2018-05-10 RX ADMIN — HYDROCODONE BITARTRATE AND ACETAMINOPHEN PRN TAB: 10; 325 TABLET ORAL at 17:43

## 2018-05-10 RX ADMIN — HYDROCODONE BITARTRATE AND ACETAMINOPHEN PRN TAB: 10; 325 TABLET ORAL at 04:22

## 2018-05-10 NOTE — RADRPT
EXAM DATE/TIME:  05/10/2018 12:52 

 

[facility] COMPARISON:     

No previous studies available for comparison.

 

 

INDICATIONS :     

Back pain

                      

 

IV CONTRAST:     

71 cc Omnipaque 350 (iohexol) IV 

 

 

ORAL CONTRAST:      

No oral contrast ingested.

                      

 

RADIATION DOSE:     

7.08 CTDIvol (mGy) 

 

 

MEDICAL HISTORY :     

Hypertension. Leukemia. Anemia

 

SURGICAL HISTORY :      

None. 

 

ENCOUNTER:      

Initial

 

ACUITY:      

2 days

 

PAIN SCALE:      

6/10

 

LOCATION:         

Abdomen

 

TECHNIQUE:     

Volumetric scanning of the abdomen and pelvis was performed.  Using automated exposure control and ad
justment of the mA and/or kV according to patient size, radiation dose was kept as low as reasonably 
achievable to obtain optimal diagnostic quality images.  DICOM format image data is available electro
nically for review and comparison.  

 

FINDINGS:     

There are innumerable sclerotic lesions in the skeleton most characteristic of sclerotic bony metasta
tic disease. There is a rotatory lumbar dextroscoliosis with a left lateral listhesis of L1 on L2.

 

Lung bases demonstrate numerous any pulmonary nodules and a very faint in appearance. Differential di
agnosis includes metastatic disease given the history of the bony findings. Bilateral breast augmenta
tion.

 

No significant abnormality in the liver, spleen, adrenals or pancreas. Numerous calcified gallstones.
 Tiny bilateral renal cysts.

 

Stomach is distended with a large air-fluid level.

 

No bowel obstruction. No free fluid or free air. Colonic diverticulosis noted.

 

CONCLUSION:     

1. Innumerable sclerotic bony lesions most characteristic of bony metastatic disease.

2. Numerous faint nodules at the lung bases also suspicious for metastatic disease.

3. Moderate coronary calcifications. Tiny renal cysts. Multiple gallstones.

 

 

 

 Migue Valenzuela MD on May 10, 2018 at 13:47           

Board Certified Radiologist.

 This report was verified electronically.

## 2018-05-10 NOTE — HHI.FPPN
Subjective


Remarks


Patient seen and examined at bedside this morning.  Patient stated she did not 

sleep well last night due to pain.  She received Reglan with her Dilaudid since 

administration of Dilaudid made her nauseous.  Patient reports feeling very 

tired this morning.


 (Jose Garcia MD, R1)





Objective


Vitals





Vital Signs








  Date Time  Temp Pulse Resp B/P (MAP) Pulse Ox O2 Delivery O2 Flow Rate FiO2


 


5/10/18 04:00 97.9 94 18 191/99 (129) 97   


 


5/10/18 00:00 98.1 95 22 185/96 (125) 97   


 


5/9/18 20:00 97.7 85 22 195/102 (133) 99   


 


5/9/18 15:21   18     


 


5/9/18 15:12  92 16 173/84 (113) 97   


 


5/9/18 14:35        


 


5/9/18 14:00  82 19 204/76 (118) 97 Room Air  














I/O      


 


 5/9/18 5/9/18 5/9/18 5/10/18 5/10/18 5/10/18





 06:59 14:59 22:59 06:59 14:59 22:59


 


Intake Total   480 ml   


 


Balance   480 ml   


 


      


 


Intake Oral   480 ml   


 


# Voids   3   








 (Jose Garcia MD, R1)


Result Diagram:  


5/10/18 0813                                                                   

             5/10/18 0813





Imaging





Last Impressions








Head CT 5/9/18 0950 Signed





Impressions: 





 Service Date/Time:  Wednesday, May 9, 2018 10:18 - CONCLUSION:  1. Chronic 





 changes of mild symmetric cortical atrophy and minimal white matter changes. 

2. 





 Nothing acute.     Telly Norton MD 


 


Chest X-Ray 5/9/18 0950 Signed





Impressions: 





 Service Date/Time:  Wednesday, May 9, 2018 10:06 - CONCLUSION:  No acute 





 cardiopulmonary process.     Telly Norton MD 


 


Pelvis CT 5/9/18 0000 Signed





Impressions: 





 Service Date/Time:  Wednesday, May 9, 2018 21:10 - CONCLUSION:  Widespread 

small 





 sclerotic lesions throughout all the visualized bony structures concerning for 





 widespread metastatic disease. Innumerable bone islands and osteopoikilosis 





 could have a similar appearance but metastatic lesions are more common.     

Wiley Ricci MD 


 


Lower Extremity CT 5/9/18 0000 Signed





Impressions: 





 Service Date/Time:  Wednesday, May 9, 2018 21:10 - CONCLUSION:  1. Numerous 





 small sclerotic foci seen throughout the visualized structures in the left 





 pelvis in the left proximal femur. These could be from sclerotic bone 





 lesions/metastatic disease. Multiple bone islands and osteopoikilosis could 

have 





 a similar appearance. 2. Cortical thickening at the medial distal femoral 

shaft 





 with central calcification. This could be from a prior injury. This is 





 incompletely evaluated on this left hip CT examination. This is only seen on 

the 





  image. This finding should be correlated with any prior history.     

Wiley Ricci MD 


 


Knee X-Ray 5/9/18 0000 Signed





Impressions: 





 Service Date/Time:  Wednesday, May 9, 2018 13:31 - CONCLUSION:  1. Faint 





 chondrocalcinosis of the lateral menisci. 2. Otherwise negative. No fracture, 





 significant degenerative changes or effusion.     .     Telly Norton MD 


 


Hip and Pelvis X-Ray 5/9/18 0000 Signed





Impressions: 





 Service Date/Time:  Wednesday, May 9, 2018 13:29 - CONCLUSION:  1. Faint 





 heterogeneity of the axial skeleton as above. Subtle blastic metastasis cannot 





 be excluded. 2. Recommend CT scan of the pelvis without IV contrast for 

further 





 characterization.     Telly Norton MD 








Objective Remarks


GENERAL: This is a thin white-female patient laying in bed.


SKIN: No rashes or lesions. Cool and dry. Bruise on inner aspect of right arm, 

pt a previous disc to low platelet count.


HEAD: Atraumatic. Normocephalic. 


NECK: Trachea midline. No JVD or lymphadenopathy. Supple, nontender, no 

meningeal signs. FROM. 


CARDIOVASCULAR: Regular rate and rhythm without murmurs, gallops, or rubs. 


RESPIRATORY: Clear to auscultation. Breath sounds equal bilaterally. No wheezes

, rales, or rhonchi.  


GASTROINTESTINAL: Abdomen soft, non-tender, nondistended. 


MUSCULOSKELETAL: Extremities without clubbing, cyanosis, or edema. No effusion, 

or edema noted. No calf tenderness.  Tender to palpation along upper aspect of 

left knee No spinal tenderness on exam. +2 DP pulses BL.


NEUROLOGICAL: Awake and alert. No focal neurological deficits.  Normal speech.


 (Jose Garcia MD, R1)





A/P


Assessment and Plan


Patient is a 73-year-old female with past medical history of hypertension and 

CML (follows with Dr. Conway) presenting with:


 (Jose Garcia MD, R1)


Attending Attestation


Patient seen and examined.  Case reviewed and discussed with the resident team.

  Agree with plan of care as discussed with me and documented in the resident 

note.


 (Nicolasa Lancaster MD)


Problem List:  


(1) Leg pain, left


ICD Codes:  M79.605 - Pain in left leg


Status:  Acute


Plan:  Patient with 6 day history of severe left leg pain (more pronounced on 

the left hip and left knee) affecting ambulation.  Pain began during 

chemotherapy treatment for CML last Friday.





Pain control:


[place] per pain scale


Dilaudid 0.4 milligrams IV every 3 for breakthrough pain.  Continue with Reglan 

to prevent nausea with Dilaudid dose.


Toradol 30 mg IV 1 given








Imaging:


Chest x-ray normal


Left knee: No fractures, significant degenerative changes or effusion. 


Left hip/pelvis x-ray: Subtle heterogenicity of the axial skeleton more 

pronounced in the proximal femur in both his KI.  Osseous structures otherwise 

intact with no acute fracture.  Subtle metastasis cannot be excluded. 

recommendations of CT scan of pelvis without contrast for further evaluation.


CT pelvis without contrast: Widespread small sclerotic lesions throughout 

concerning for widespread metastasis.





Follow-up


CT ab/pelvis





(2) CML (chronic myelocytic leukemia)


ICD Codes:  C92.10 - Chronic myeloid leukemia, BCR/ABL-positive, not having 

achieved remission


Status:  Chronic


Plan:  Patient follows with Dr. Conway for CML


Currently undergoing second round of chemotherapy (Acetabine)


Medical oncology consulted (Dr. Cnoway), appreciate recommendations


Discussed case with Dr. Conway, he is concerned the patient may have meningeal 

disease causing bone pain as he has a similar patient case in the past.


CT head: No acute findings





See plan above





(3) Hypertension


ICD Codes:  I10 - Essential (primary) hypertension


Status:  Chronic


Plan:  Patient found to be have elevated blood pressures in the ED.


Patient continues with elevated blood pressures


Continue with home medication


Vasotec as needed for hypertension protocol


Continue to monitor





(4) Elevated troponin


ICD Codes:  R74.8 - Abnormal levels of other serum enzymes


Status:  Resolved


Plan:  In the ED patient was found to have elevated troponin of 0.14. 


Patient denies any active chest pain, shortness of breath, or palpitations.


EKG normal, per medical team read





(5) Nutrition, metabolism, and development symptoms


ICD Codes:  R63.8 - Other symptoms and signs concerning food and fluid intake


Plan:  Fluids: Tolerating p.o.


Electrolytes: Within normal limits, replete as needed


Nutrition: Regular diet





DVT prophylaxis: SCDs


 (Jose Garcia MD, R1)





Problem Qualifiers





(1) Hypertension:  


Qualified Codes:  I10 - Essential (primary) hypertension








Jose Garcia MD, R1 May 10, 2018 13:42


Nicolasa Lancaster MD May 10, 2018 15:23

## 2018-05-10 NOTE — EKG
Date Performed: 05/09/2018       Time Performed: 13:49:16

 

PTAGE:      73 years

 

EKG:      Sinus rhythm 

 

 NORMAL ECG Since the 

 

 PREVIOUS TRACING            , no significant change noted  PREVIOUS TRACING 10/20/2010 @23. 09.28

 

DOCTOR:   Robert Sun  Interpretating Date/Time  05/10/2018 19:49:31

## 2018-05-10 NOTE — PD.ONC.PN
Subjective


Subjective


Remarks


Afebrile overnight. 


Patient resting in bed. 


She says she had a horrible night and was very uncomfortable. 


She felt anxious and had low back pain. 


she felt constipated.





Objective


Data











  Date Time  Temp Pulse Resp B/P (MAP) Pulse Ox O2 Delivery O2 Flow Rate FiO2


 


5/10/18 04:00 97.9 94 18 191/99 (129) 97   


 


5/10/18 00:00 98.1 95 22 185/96 (125) 97   


 


5/9/18 20:00 97.7 85 22 195/102 (133) 99   


 


5/9/18 15:21   18     


 


5/9/18 15:12  92 16 173/84 (113) 97   


 


5/9/18 14:35        


 


5/9/18 14:00  82 19 204/76 (118) 97 Room Air  


 


5/9/18 11:00  91 19 180/82 (114) 97 Room Air  


 


5/9/18 10:46     97 Room Air  


 


5/9/18 10:08   20  97 Room Air  








Result Diagram:  


5/10/18 0813                                                                   

             5/10/18 0813





Laboratory Results





Laboratory Tests








Test


  5/9/18


10:15 5/9/18


11:40 5/10/18


08:13


 


White Blood Count 11.8 TH/MM3   18.9 TH/MM3 


 


Red Blood Count 2.76 MIL/MM3   3.12 MIL/MM3 


 


Hemoglobin 7.9 GM/DL   9.0 GM/DL 


 


Hematocrit 23.6 %   26.4 % 


 


Mean Corpuscular Volume 85.2 FL   84.8 FL 


 


Mean Corpuscular Hemoglobin 28.5 PG   28.7 PG 


 


Mean Corpuscular Hemoglobin


Concent 33.5 % 


  


  33.9 % 


 


 


Red Cell Distribution Width 23.9 %   25.0 % 


 


Platelet Count 69 TH/MM3   57 TH/MM3 


 


Mean Platelet Volume 11.1 FL   9.0 FL 


 


CBC Comment AUTO DIFF   AUTO DIFF 


 


Differential Total Cells


Counted 100 


  


  100 


 


 


Neutrophils % (Manual) 54 %   34 % 


 


Band Neutrophils % 10 %   14 % 


 


Lymphocytes % 16 %   28 % 


 


Monocytes % 8 %   17 % 


 


Eosinophils % 1 %   


 


Neutrophils # (Manual) 8.9 TH/MM3   10.2 TH/MM3 


 


Metamyelocytes 10 %   2 % 


 


Myelocytes 1 %   3 % 


 


Differential Comment


  FINAL DIFF


MANUAL 


  FINAL DIFF


MANUAL


 


Platelet Estimate LOW   LOW 


 


Platelet Morphology Comment NORMAL   NORMAL 


 


Tear Drop Cells 2+   1+ 


 


Ovalocytes 1+   1+ 


 


Keratocytes OCC   


 


Prothrombin Time 10.6 SEC   


 


Prothromb Time International


Ratio 1.0 RATIO 


  


  


 


 


Activated Partial


Thromboplast Time 24.9 SEC 


  


  


 


 


Blood Urea Nitrogen 13 MG/DL   14 MG/DL 


 


Creatinine 0.73 MG/DL   0.72 MG/DL 


 


Random Glucose 115 MG/DL   108 MG/DL 


 


Total Protein 7.0 GM/DL   7.1 GM/DL 


 


Albumin 3.8 GM/DL   4.0 GM/DL 


 


Calcium Level 9.0 MG/DL   8.7 MG/DL 


 


Magnesium Level 2.0 MG/DL   


 


Alkaline Phosphatase 544 U/L   755 U/L 


 


Aspartate Amino Transf


(AST/SGOT) 48 U/L 


  


  80 U/L 


 


 


Alanine Aminotransferase


(ALT/SGPT) 44 U/L 


  


  56 U/L 


 


 


Total Bilirubin 0.8 MG/DL   1.1 MG/DL 


 


Sodium Level 141 MEQ/L   139 MEQ/L 


 


Potassium Level 4.1 MEQ/L   3.5 MEQ/L 


 


Chloride Level 106 MEQ/L   100 MEQ/L 


 


Carbon Dioxide Level 24.7 MEQ/L   27.7 MEQ/L 


 


Anion Gap 10 MEQ/L   11 MEQ/L 


 


Estimat Glomerular Filtration


Rate 78 ML/MIN 


  


  80 ML/MIN 


 


 


Total Creatine Kinase 22 U/L   


 


Troponin I 0.14 NG/ML   


 


B-Type Natriuretic Peptide 136 PG/ML   


 


Lipase 181 U/L   


 


Thyroid Stimulating Hormone


3rd Gen 0.535 uIU/ML 


  


  


 


 


Urine Color  LIGHT-YELLOW  


 


Urine Turbidity  CLEAR  


 


Urine pH  6.0  


 


Urine Specific Gravity  1.006  


 


Urine Protein  NEG mg/dL  


 


Urine Glucose (UA)  NEG mg/dL  


 


Urine Ketones  NEG mg/dL  


 


Urine Occult Blood  SMALL  


 


Urine Nitrite  NEG  


 


Urine Bilirubin  NEG  


 


Urine Urobilinogen


  


  LESS THAN 2.0


MG/DL 


 


 


Urine Leukocyte Esterase  NEG  


 


Urine RBC  2 /hpf  


 


Urine WBC


  


  LESS THAN 1


/hpf 


 


 


Urine Squamous Epithelial


Cells 


  1 /hpf 


  


 


 


Microscopic Urinalysis Comment


  


  CULT NOT


INDICATED 


 


 


Promyelocytes   1 % 


 


Nucleated Red Blood Cells   2 /100 WBC 


 


Blastocytes   1 % 











Administered Medications








 Medications


  (Trade)  Dose


 Ordered  Sig/Neymar


 Route


 PRN Reason  Start Time


 Stop Time Status Last Admin


Dose Admin


 


 Acetaminophen/


 Hydrocodone Bitart


  (Norco  Mg)  1 tab  Q4H  PRN


 PO


 PAIN SCALE 6 TO 10  5/9/18 16:45


    5/10/18 09:09


 


 


 Enalapril Maleate


  (Vasotec)  10 mg  DAILY


 PO


   5/10/18 09:00


    5/10/18 09:08


 


 


 Senna/Docusate


 Sodium


  (Alexsandra-Colace)  1 tab  BID


 PO


   5/9/18 21:00


    5/10/18 09:08


 


 


 Lactulose


  (Lactulose Liq)  30 ml  DAILY  PRN


 PO


 SEVERE CONSITIPATION  5/9/18 18:15


    5/9/18 22:02


 


 


 Temazepam


  (Restoril)  15 mg  HS  PRN


 PO


 SLEEP  5/9/18 20:15


    5/10/18 01:09


 


 


 Prednisone


  (Deltasone)  20 mg  TID


 PO


   5/10/18 09:00


    5/10/18 09:08


 


 


 Pantoprazole


 Sodium


  (Protonix)  40 mg  DAILY


 PO


   5/10/18 09:00


    5/10/18 09:08


 


 


 Enalaprilat


  (Vasotec Inj)  2.5 mg  Q6H  PRN


 IV PUSH


 SBP> OR = 180, DBP> OR = 100  5/9/18 22:45


    5/10/18 04:21


 








Objective Remarks


GENERAL: Elderly female, lying in bed, appears uncomfortable, overwhelmed, 

anxious.


SKIN: Warm and dry.


HEAD: Normocephalic.


EYES: No injection or drainage. 


NECK: Supple, trachea midline. 


CARDIOVASCULAR: Regular rate and rhythm


RESPIRATORY: Breath sounds equal bilaterally. No accessory muscle use.


GASTROINTESTINAL: Abdomen soft, non-tender, nondistended. 


EXTREMITIES: No cyanosis


NEUROLOGICAL: awake and alert. normal speech.





Assessment/Plan


Assessment


74y/o with chronic myelomonocytic leukemia admitted for severe uncontrolled 

pain. 


h/o Hypertension. Irritable bowel syndrome. History of recurrent hematuria. 

Diverticulosis.


Plan


1. alkaline phosphatase and LFT's are rising as well as WBC.  will obtain CT ab/

pelvis for further clarification


2. pain: continue Lortab 10 for now with IV dilaudid for breakthrough and will 

titrate up as needed


3. Nausea: give Zofran with each dose of dilaudid or Lortab.  continue Reglan 

for breakthrough nausea


4. constipation: continue pericolace + PRN MoM/Lactulose


Attending Statement


The exam, history, and the medical decision-making described in the above note 

were completed with the assistance of the mid-level provider. I reviewed and 

agree with the findings presented.  I attest that I had a face-to-face 

encounter with the patient on the same day, and personally performed and 

documented my assessment and findings in the medical record.





The LDH of 2,300 is indicative of massive cell turnover and is seen in tumors 

with high proliferative phases.  The rise in the alk phos and the findings of 

sclerotic lesions on the ct scan suggests that this is bone/bone marrow 

process.   In spite of the fact that the patient received her last dose of  

decitabine Friday the wbc count is rising where it should be falling.  I 

suspect that she has evolved into AML or has a rapidly progressive CMML, 

neither compatible with life unless the course of the illness can be changed.  

Today she learned that her 4th sibling, a sister, is a perfect match for a 

transplant.  I have spoken with Dr. Van at [facility] and he agrees to take 

her in transfer tomorrow am.  I have ordered OH-UREA to halt the proliferative 

phase which I believe accounts for the pain and will give her 2 grams PO today.

   She is exhausted and have ordered meds for sleep.   I spent 45 minutes with 

her and Trell discussing the situation.  She needs a bone marrow aspirate and 

bx and this should be done at [facility] and not here.   Her transfer should not be 

delayed as her window of opportunity will not be long.   I also discussed this 

with her primary care MD, Dr. Beauchamp.  total time in excess of one hour.











Kaylan Dennis May 10, 2018 10:17


Arturo Conway MD May 10, 2018 21:50

## 2018-05-11 VITALS
TEMPERATURE: 98.5 F | HEART RATE: 127 BPM | OXYGEN SATURATION: 98 % | DIASTOLIC BLOOD PRESSURE: 111 MMHG | SYSTOLIC BLOOD PRESSURE: 192 MMHG | RESPIRATION RATE: 22 BRPM

## 2018-05-11 VITALS
OXYGEN SATURATION: 98 % | TEMPERATURE: 98.2 F | HEART RATE: 106 BPM | DIASTOLIC BLOOD PRESSURE: 82 MMHG | RESPIRATION RATE: 21 BRPM | SYSTOLIC BLOOD PRESSURE: 141 MMHG

## 2018-05-11 VITALS
SYSTOLIC BLOOD PRESSURE: 192 MMHG | DIASTOLIC BLOOD PRESSURE: 112 MMHG | OXYGEN SATURATION: 97 % | RESPIRATION RATE: 22 BRPM | HEART RATE: 116 BPM | TEMPERATURE: 98.9 F

## 2018-05-11 VITALS — DIASTOLIC BLOOD PRESSURE: 73 MMHG | HEART RATE: 107 BPM | SYSTOLIC BLOOD PRESSURE: 145 MMHG

## 2018-05-11 VITALS
DIASTOLIC BLOOD PRESSURE: 83 MMHG | SYSTOLIC BLOOD PRESSURE: 160 MMHG | TEMPERATURE: 98.8 F | RESPIRATION RATE: 19 BRPM | OXYGEN SATURATION: 97 % | HEART RATE: 109 BPM

## 2018-05-11 VITALS
SYSTOLIC BLOOD PRESSURE: 191 MMHG | TEMPERATURE: 98.3 F | RESPIRATION RATE: 20 BRPM | HEART RATE: 112 BPM | DIASTOLIC BLOOD PRESSURE: 100 MMHG | OXYGEN SATURATION: 96 %

## 2018-05-11 VITALS — DIASTOLIC BLOOD PRESSURE: 84 MMHG | HEART RATE: 135 BPM | SYSTOLIC BLOOD PRESSURE: 164 MMHG

## 2018-05-11 VITALS — HEART RATE: 107 BPM | SYSTOLIC BLOOD PRESSURE: 164 MMHG | DIASTOLIC BLOOD PRESSURE: 73 MMHG

## 2018-05-11 LAB
ALBUMIN SERPL-MCNC: 3.5 GM/DL (ref 3.4–5)
ALP SERPL-CCNC: 1353 U/L (ref 45–117)
ALT SERPL-CCNC: 68 U/L (ref 10–53)
AST SERPL-CCNC: 170 U/L (ref 15–37)
BILIRUB SERPL-MCNC: 1.7 MG/DL (ref 0.2–1)
BUN SERPL-MCNC: 13 MG/DL (ref 7–18)
CALCIUM SERPL-MCNC: 8.4 MG/DL (ref 8.5–10.1)
CHLORIDE SERPL-SCNC: 101 MEQ/L (ref 98–107)
CREAT SERPL-MCNC: 0.71 MG/DL (ref 0.5–1)
DACRYOCYTES BLD QL SMEAR: (no result)
ERYTHROCYTE [DISTWIDTH] IN BLOOD BY AUTOMATED COUNT: 25.3 % (ref 11.6–17.2)
GFR SERPLBLD BASED ON 1.73 SQ M-ARVRAT: 81 ML/MIN (ref 89–?)
GLUCOSE SERPL-MCNC: 149 MG/DL (ref 74–106)
HCO3 BLD-SCNC: 27.4 MEQ/L (ref 21–32)
HCT VFR BLD CALC: 25.9 % (ref 35–46)
HGB BLD-MCNC: 8.7 GM/DL (ref 11.6–15.3)
LYMPHOCYTES: 25 % (ref 9–44)
MCH RBC QN AUTO: 28.2 PG (ref 27–34)
MCHC RBC AUTO-ENTMCNC: 33.4 % (ref 32–36)
MCV RBC AUTO: 84.2 FL (ref 80–100)
METAMYELOCYTES NFR BLD: 3 % (ref 0–1)
MONOCYTES: 13 % (ref 0–8)
MYELOCYTES NFR BLD: 1 % (ref 0–0)
NEUTS BAND # BLD MANUAL: 17.2 TH/MM3 (ref 1.8–7.7)
NEUTS BAND NFR BLD: 12 % (ref 0–6)
NEUTS SEG NFR BLD MANUAL: 46 % (ref 16–70)
NUCLEATED RED BLOOD CELL: 2 (ref 0–0)
OVALOCYTES BLD QL SMEAR: (no result)
PLATELET # BLD: 41 TH/MM3 (ref 150–450)
PMV BLD AUTO: 9.8 FL (ref 7–11)
PROT SERPL-MCNC: 6.6 GM/DL (ref 6.4–8.2)
RBC # BLD AUTO: 3.07 MIL/MM3 (ref 4–5.3)
SODIUM SERPL-SCNC: 138 MEQ/L (ref 136–145)
WBC # BLD AUTO: 27.7 TH/MM3 (ref 4–11)
WBC NRBC COR # BLD: 2 /100 WBC (ref 0–0)

## 2018-05-11 RX ADMIN — PANTOPRAZOLE SCH MG: 40 TABLET, DELAYED RELEASE ORAL at 07:56

## 2018-05-11 RX ADMIN — HYDROMORPHONE HYDROCHLORIDE PRN MG: 1 INJECTION, SOLUTION INTRAMUSCULAR; INTRAVENOUS; SUBCUTANEOUS at 20:00

## 2018-05-11 RX ADMIN — ENALAPRILAT PRN MG: 1.25 INJECTION, SOLUTION INTRAVENOUS at 02:29

## 2018-05-11 RX ADMIN — HYDROCODONE BITARTRATE AND ACETAMINOPHEN PRN TAB: 10; 325 TABLET ORAL at 20:43

## 2018-05-11 RX ADMIN — ONDANSETRON PRN MG: 4 TABLET, ORALLY DISINTEGRATING ORAL at 15:16

## 2018-05-11 RX ADMIN — PHENYTOIN SODIUM SCH MLS/HR: 50 INJECTION INTRAMUSCULAR; INTRAVENOUS at 13:49

## 2018-05-11 RX ADMIN — STANDARDIZED SENNA CONCENTRATE AND DOCUSATE SODIUM SCH TAB: 8.6; 5 TABLET, FILM COATED ORAL at 20:54

## 2018-05-11 RX ADMIN — HYDROCODONE BITARTRATE AND ACETAMINOPHEN PRN TAB: 10; 325 TABLET ORAL at 02:29

## 2018-05-11 RX ADMIN — HYDROCODONE BITARTRATE AND ACETAMINOPHEN PRN TAB: 10; 325 TABLET ORAL at 17:49

## 2018-05-11 RX ADMIN — HYDROCODONE BITARTRATE AND ACETAMINOPHEN PRN TAB: 10; 325 TABLET ORAL at 08:04

## 2018-05-11 RX ADMIN — STANDARDIZED SENNA CONCENTRATE AND DOCUSATE SODIUM SCH TAB: 8.6; 5 TABLET, FILM COATED ORAL at 07:55

## 2018-05-11 RX ADMIN — HYDROCODONE BITARTRATE AND ACETAMINOPHEN PRN TAB: 10; 325 TABLET ORAL at 13:14

## 2018-05-11 RX ADMIN — LABETALOL HYDROCHLORIDE PRN MG: 5 INJECTION, SOLUTION INTRAVENOUS at 18:43

## 2018-05-11 RX ADMIN — HYDROMORPHONE HYDROCHLORIDE PRN MG: 1 INJECTION, SOLUTION INTRAMUSCULAR; INTRAVENOUS; SUBCUTANEOUS at 15:16

## 2018-05-11 RX ADMIN — ONDANSETRON PRN MG: 4 TABLET, ORALLY DISINTEGRATING ORAL at 20:47

## 2018-05-11 RX ADMIN — HYDROMORPHONE HYDROCHLORIDE PRN MG: 1 INJECTION, SOLUTION INTRAMUSCULAR; INTRAVENOUS; SUBCUTANEOUS at 18:44

## 2018-05-11 RX ADMIN — LABETALOL HYDROCHLORIDE PRN MG: 5 INJECTION, SOLUTION INTRAVENOUS at 04:27

## 2018-05-11 NOTE — HHI.FPPN
Subjective


Remarks


Overnight had asymptomatic BP elevation, otherwise no events. Feels much better 

this morning re: pain and anxiety. NO CP/SOB. No N/V or abdominal pain. 


 (Edward Lira MD R2)





Objective


Vitals





Vital Signs








  Date Time  Temp Pulse Resp B/P (MAP) Pulse Ox O2 Delivery O2 Flow Rate FiO2


 


5/11/18 07:45 98.8 109 19 160/83 (108) 97   


 


5/11/18 05:45  107  164/73 (103)    


 


5/11/18 04:36  107  145/73 (97)    


 


5/11/18 04:22  135  164/84 (110)    


 


5/11/18 04:00  127      


 


5/11/18 04:00 98.5 114 22 192/111 (138) 98   


 


5/11/18 00:00 98.9 116 22 192/112 (138) 97   


 


5/11/18 00:00  112      


 


5/10/18 20:00 98.5 117 22 190/112 (138) 98   


 


5/10/18 20:00  114      


 


5/10/18 17:59 98.0 111 16 191/100 (130) 100   


 


5/10/18 17:00  100      


 


5/10/18 16:30    182/100 (127)    


 


5/10/18 10:35    192/100 (130)    














I/O      


 


 5/10/18 5/10/18 5/10/18 5/11/18 5/11/18 5/11/18





 07:00 15:00 23:00 07:00 15:00 23:00


 


Intake Total   660 ml 240 ml  


 


Output Total   600 ml 400 ml  


 


Balance   60 ml -160 ml  


 


      


 


Intake Oral   660 ml 240 ml  


 


Output Urine Total   600 ml 400 ml  








 (Edward Lira MD R2)


Result Diagram:  


5/11/18 0556                                                                   

             5/11/18 0556





Imaging





Last Impressions








Abdomen/Pelvis CT 5/10/18 0000 Signed





Impressions: 





 Service Date/Time:  Thursday, May 10, 2018 12:52 - CONCLUSION:  1. Innumerable 





 sclerotic bony lesions most characteristic of bony metastatic disease. 2. 





 Numerous faint nodules at the lung bases also suspicious for metastatic 

disease. 





 3. Moderate coronary calcifications. Tiny renal cysts. Multiple gallstones.   

  





 Migue Valenzuela MD 


 


Head CT 5/9/18 0950 Signed





Impressions: 





 Service Date/Time:  Wednesday, May 9, 2018 10:18 - CONCLUSION:  1. Chronic 





 changes of mild symmetric cortical atrophy and minimal white matter changes. 

2. 





 Nothing acute.     Telly Norton MD 


 


Chest X-Ray 5/9/18 0950 Signed





Impressions: 





 Service Date/Time:  Wednesday, May 9, 2018 10:06 - CONCLUSION:  No acute 





 cardiopulmonary process.     Telly Norton MD 


 


Pelvis CT 5/9/18 0000 Signed





Impressions: 





 Service Date/Time:  Wednesday, May 9, 2018 21:10 - CONCLUSION:  Widespread 

small 





 sclerotic lesions throughout all the visualized bony structures concerning for 





 widespread metastatic disease. Innumerable bone islands and osteopoikilosis 





 could have a similar appearance but metastatic lesions are more common.     

Wiley Ricci MD 


 


Lower Extremity CT 5/9/18 0000 Signed





Impressions: 





 Service Date/Time:  Wednesday, May 9, 2018 21:10 - CONCLUSION:  1. Numerous 





 small sclerotic foci seen throughout the visualized structures in the left 





 pelvis in the left proximal femur. These could be from sclerotic bone 





 lesions/metastatic disease. Multiple bone islands and osteopoikilosis could 

have 





 a similar appearance. 2. Cortical thickening at the medial distal femoral 

shaft 





 with central calcification. This could be from a prior injury. This is 





 incompletely evaluated on this left hip CT examination. This is only seen on 

the 





  image. This finding should be correlated with any prior history.     

Wiley Ricci MD 


 


Knee X-Ray 5/9/18 0000 Signed





Impressions: 





 Service Date/Time:  Wednesday, May 9, 2018 13:31 - CONCLUSION:  1. Faint 





 chondrocalcinosis of the lateral menisci. 2. Otherwise negative. No fracture, 





 significant degenerative changes or effusion.     .     Telly Norton MD 


 


Hip and Pelvis X-Ray 5/9/18 0000 Signed





Impressions: 





 Service Date/Time:  Wednesday, May 9, 2018 13:29 - CONCLUSION:  1. Faint 





 heterogeneity of the axial skeleton as above. Subtle blastic metastasis cannot 





 be excluded. 2. Recommend CT scan of the pelvis without IV contrast for 

further 





 characterization.     Telly Norton MD 








Objective Remarks


GENERAL: This is a thin white-female patient sitting up in bed, pleasant, NAD


CARDIOVASCULAR: Regular rate and rhythm without murmurs, gallops, or rubs. 


RESPIRATORY: Clear to auscultation. Breath sounds equal bilaterally. No wheezes 

or crackles. 


GASTROINTESTINAL: Abdomen soft, non-tender, nondistended. 


MUSCULOSKELETAL: Extremities without clubbing, cyanosis, or edema. 


NEUROLOGICAL: Awake and alert. No focal neurological deficits.  Normal speech.


Medications and IVs





Current Medications








 Medications


  (Trade)  Dose


 Ordered  Sig/Neymar


 Route  Start Time


 Stop Time Status Last Admin


 


  (Norco  5-325 Mg)  1 tab  Q4H  PRN


 PO  5/9/18 16:45


     


 


 


  (Norco  Mg)  1 tab  Q4H  PRN


 PO  5/9/18 16:45


    5/11/18 08:04


 


 


  (Narcan Inj)  0.4 mg  UNSCH  PRN


 IV PUSH  5/9/18 16:45


     


 


 


  (Alexsandra-Colace)  1 tab  BID


 PO  5/9/18 21:00


    5/11/18 07:55


 


 


  (Milk Of


 Magnesia Liq)  30 ml  Q12H  PRN


 PO  5/9/18 18:15


    5/11/18 07:54


 


 


  (Senokot)  17.2 mg  Q12H  PRN


 PO  5/9/18 18:15


     


 


 


  (Dulcolax Supp)  10 mg  DAILY  PRN


 RECTAL  5/9/18 18:15


     


 


 


  (Lactulose Liq)  30 ml  DAILY  PRN


 PO  5/9/18 18:15


    5/9/18 22:02


 


 


  (Restoril)  15 mg  HS  PRN


 PO  5/9/18 20:15


    5/10/18 01:09


 


 


  (Deltasone)  20 mg  TID


 PO  5/10/18 09:00


    5/11/18 07:55


 


 


  (Protonix)  40 mg  DAILY


 PO  5/10/18 09:00


    5/11/18 07:56


 


 


  (Reglan Inj)  5 mg  Q8H  PRN


 IV PUSH  5/10/18 05:45


     


 


 


  (Zofran  Odt)  4 mg  Q4H  PRN


 PO  5/10/18 11:00


    5/10/18 10:47


 


 


  (Dilaudid Pf Inj)  0.8 mg  Q2HR  PRN


 IV PUSH  5/10/18 13:30


    5/10/18 13:45


 


 


  (Pill Splitter)  1 ea  UNSCH  PRN


 OTHER  5/10/18 13:30


     


 


 


 Sodium Chloride  1,000 ml @ 


 42 mls/hr  [id number]


 IV  5/10/18 14:00


    5/10/18 15:16


 


 


  (Apresoline Inj)  10 mg  Q6H  PRN


 IV PUSH  5/10/18 17:00


    5/11/18 03:45


 


 


  (Trandate Inj)  10 mg  Q6H  PRN


 IV PUSH  5/11/18 04:15


    5/11/18 04:27


 


 


  (Vasotec)  20 mg  DAILY


 PO  5/11/18 09:00


    5/11/18 07:55


 








 (Edward Lira MD R2)





A/P


Assessment and Plan


Patient is a 73-year-old female with past medical history of hypertension and 

CML (follows with Dr. Conway) presenting with:


 (Edward Lira MD R2)


Attending Attestation


Patient seen and examined.  Case reviewed and discussed with the resident team.

  Agree with plan of care as discussed with me and documented in the resident 

note.


 (Nicolasa Lancaster MD)


Problem List:  


(1) CML (chronic myelocytic leukemia)


ICD Codes:  C92.10 - Chronic myeloid leukemia, BCR/ABL-positive, not having 

achieved remission


Status:  Chronic


Plan:  With imaging findings noted and WBC increasing despite chemo possibly 

this is AML vs continued CMML


CT pelvis without contrast: Widespread small sclerotic lesions throughout 

concerning for widespread metastasis.


Patient follows with Dr. Conway for CML





Medical oncology consulted (Dr. Conway), appreciate recommendations


   -Given the above, will transfer to [facility] for possible BMT (this degree of 

AML or rapidly progressive CMML would not be compatible with life if course 

cannot be changed)


   -S/p hydroxyurea which has helped pain





(2) Leg pain, left


ICD Codes:  M79.605 - Pain in left leg


Status:  Resolved


Plan:  Likely bone pain from cancer involvement of marrow plus chemotherapy 

effect





Imaging:


Chest x-ray normal


Left knee: No fractures, significant degenerative changes or effusion. 


Left hip/pelvis x-ray: Subtle heterogenicity of the axial skeleton more 

pronounced in the proximal femur in both his KI.  Osseous structures otherwise 

intact with no acute fracture.  Subtle metastasis cannot be excluded. 

recommendations of CT scan of pelvis without contrast for further evaluation.


CT pelvis without contrast: Widespread small sclerotic lesions throughout 

concerning for widespread metastasis.





- Pain control per [facility] on transfer (likely will need further opiate therapy)


- s/p hydroxyurea per oncology recs which seems to have helped





(3) Hypertension


ICD Codes:  I10 - Essential (primary) hypertension


Status:  Chronic


Plan:  BP had been persistently elevated this hospitalization





-Increase home enalapril to 20 mg daily on transfer


-PRN antihypertensives per [facility]





(4) Elevated troponin


ICD Codes:  R74.8 - Abnormal levels of other serum enzymes


Status:  Resolved


Plan:  In the ED patient was found to have elevated troponin of 0.14. 


Patient denies any active chest pain, shortness of breath, or palpitations.


EKG normal, per medical team read


Telemetry without ST changes





-Monitor clinically; elevated troponin likely not cardiac related





(5) Nutrition, metabolism, and development symptoms


ICD Codes:  R63.8 - Other symptoms and signs concerning food and fluid intake


Plan:  Fluids: Tolerating p.o.


Electrolytes: Within normal limits, replete as needed


Nutrition: Regular diet





DVT prophylaxis: SCDs





Dispo: Transfer to [facility] today AM


 (Edward Lira MD R2)





Problem Qualifiers





(1) Hypertension:  


Qualified Codes:  I10 - Essential (primary) hypertension








Edward Lira MD R2 May 11, 2018 09:16


Nicolasa Lancaster MD May 11, 2018 13:37

## 2018-05-11 NOTE — HHI.DS
Discharge Summary


Admission Date


May 9, 2018 at 12:19 pm


Discharge Date:  May 11, 2018


Admitting Diagnosis





NONSTEMI, ANEMIA, LEUKEMIA





(1) CML (chronic myelocytic leukemia)


Diagnosis:  Principal


ICD Codes:  C92.10 - Chronic myeloid leukemia, BCR/ABL-positive, not having 

achieved remission


Status:  Chronic


(2) Leg pain, left


Diagnosis:  Principal


ICD Codes:  M79.605 - Pain in left leg


Status:  Resolved


(3) Hypertension


Diagnosis:  Secondary


ICD Codes:  I10 - Essential (primary) hypertension


Status:  Chronic


(4) Elevated troponin


Diagnosis:  Secondary


ICD Codes:  R74.8 - Abnormal levels of other serum enzymes


Status:  Resolved


Consultants


Oncology - Dr. Conway


Brief History


Patient is a 73-year-old female with past medical history of hypertension and 

CML (follows with Dr. Conway) presented to the ED from Dr. Conway's office this 

morning due to severe left leg pain requiring use of wheelchair. Patient went 

to Dr. Conway's office today for blood work and was sent to the ED by his nurse 

practitioner because she did not look well. Patient stated that pain is more 

severe on her left hip and left knee causing her to have difficulty ambulating.

  She describes the pain as an aching/soreness on her left thigh.  She has not 

been able to sleep well due to the severe leg pain. Patient stated the left leg 

pain began while she was receiving her second round of chemotherapy (Acetabine) 

last Friday.  Patient has received ibuprofen and steroids for the pain with no 

relief.  She also received Dilaudid which she said helped subside the pain for 

a limited amount of time. Denies chest pain, shortness of breath, nausea 

vomiting, photophobia, neck stiffness, fever or chills.





Of note patient denies ever having similar pain.  She received her first round 

of chemotherapy on March 26 for a total of  4 days without having any similar 

symptoms.








EP physicians and was able to speak with Dr. Conway who was concerned for 

meningeal disease causing the severe left leg pain.  Patient refused LP.


CBC/BMP:  


5/11/18 0556                                                                   

             5/11/18 0556





Significant Findings





Laboratory Tests








Test


  5/9/18


10:15 5/9/18


11:40 5/10/18


08:13 5/11/18


05:56


 


White Blood Count


  11.8 TH/MM3


(4.0-11.0) 


  18.9 TH/MM3


(4.0-11.0) 27.7 TH/MM3


(4.0-11.0)


 


Red Blood Count


  2.76 MIL/MM3


(4.00-5.30) 


  3.12 MIL/MM3


(4.00-5.30) 3.07 MIL/MM3


(4.00-5.30)


 


Hemoglobin


  7.9 GM/DL


(11.6-15.3) 


  9.0 GM/DL


(11.6-15.3) 8.7 GM/DL


(11.6-15.3)


 


Hematocrit


  23.6 %


(35.0-46.0) 


  26.4 %


(35.0-46.0) 25.9 %


(35.0-46.0)


 


Red Cell Distribution Width


  23.9 %


(11.6-17.2) 


  25.0 %


(11.6-17.2) 25.3 %


(11.6-17.2)


 


Platelet Count


  69 TH/MM3


(150-450) 


  57 TH/MM3


(150-450) 41 TH/MM3


(150-450)


 


Mean Platelet Volume


  11.1 FL


(7.0-11.0) 


  


  


 


 


Band Neutrophils % 10 % (0-6)   14 % (0-6)  12 % (0-6) 


 


Neutrophils # (Manual)


  8.9 TH/MM3


(1.8-7.7) 


  10.2 TH/MM3


(1.8-7.7) 17.2 TH/MM3


(1.8-7.7)


 


Metamyelocytes 10 % (0-1)   2 % (0-1)  3 % (0-1) 


 


Myelocytes 1 % (0-0)   3 % (0-0)  1 % (0-0) 


 


Platelet Estimate LOW (NORMAL)   LOW (NORMAL)  LOW (NORMAL) 


 


Tear Drop Cells 2+ (NORMAL)   1+ (NORMAL)  1+ (NORMAL) 


 


Ovalocytes 1+ (NORMAL)   1+ (NORMAL)  2+ (NORMAL) 


 


Keratocytes OCC (NORMAL)    


 


Random Glucose


  115 MG/DL


() 


  108 MG/DL


() 149 MG/DL


()


 


Alkaline Phosphatase


  544 U/L


() 


  755 U/L


() 1353 U/L


()


 


Aspartate Amino Transf


(AST/SGOT) 48 U/L (15-37) 


  


  80 U/L (15-37) 


  170 U/L


(15-37)


 


Estimat Glomerular Filtration


Rate 78 ML/MIN


(>89) 


  80 ML/MIN


(>89) 81 ML/MIN


(>89)


 


Total Creatine Kinase


  22 U/L


() 


  


  


 


 


Troponin I


  0.14 NG/ML


(0.02-0.05) 


  


  


 


 


B-Type Natriuretic Peptide


  136 PG/ML


(0-100) 


  


  


 


 


Urine Occult Blood  SMALL (NEG)   


 


Monocytes %   17 % (0-8)  13 % (0-8) 


 


Promyelocytes   1 % (0-0)  


 


Nucleated Red Blood Cells


  


  


  2 /100 WBC


(0-0) 2 /100 WBC


(0-0)


 


Blastocytes   1 % (0-0)  


 


Alanine Aminotransferase


(ALT/SGPT) 


  


  56 U/L (10-53) 


  68 U/L (10-53) 


 


 


Total Bilirubin


  


  


  1.1 MG/DL


(0.2-1.0) 1.7 MG/DL


(0.2-1.0)


 


Uric Acid


  


  


  6.6 MG/DL


(2.6-6.0) 


 


 


Lactate Dehydrogenase


  


  


  2300 U/L


() 6180 U/L


()


 


Calcium Level


  


  


  


  8.4 MG/DL


(8.5-10.1)


 


Test


  5/11/18


14:04 


  


  


 


 


Troponin I


  0.11 NG/ML


(0.02-0.05) 


  


  


 








Imaging





Last Impressions








Abdomen/Pelvis CT 5/10/18 0000 Signed





Impressions: 





 Service Date/Time:  Thursday, May 10, 2018 12:52 - CONCLUSION:  1. Innumerable 





 sclerotic bony lesions most characteristic of bony metastatic disease. 2. 





 Numerous faint nodules at the lung bases also suspicious for metastatic 

disease. 





 3. Moderate coronary calcifications. Tiny renal cysts. Multiple gallstones.   

  





 Migue Valenzuela MD 


 


Head CT 5/9/18 0950 Signed





Impressions: 





 Service Date/Time:  Wednesday, May 9, 2018 10:18 - CONCLUSION:  1. Chronic 





 changes of mild symmetric cortical atrophy and minimal white matter changes. 

2. 





 Nothing acute.     Telly Norton MD 


 


Chest X-Ray 5/9/18 0950 Signed





Impressions: 





 Service Date/Time:  Wednesday, May 9, 2018 10:06 - CONCLUSION:  No acute 





 cardiopulmonary process.     Telly Norton MD 


 


Pelvis CT 5/9/18 0000 Signed





Impressions: 





 Service Date/Time:  Wednesday, May 9, 2018 21:10 - CONCLUSION:  Widespread 

small 





 sclerotic lesions throughout all the visualized bony structures concerning for 





 widespread metastatic disease. Innumerable bone islands and osteopoikilosis 





 could have a similar appearance but metastatic lesions are more common.     

Wiley Ricci MD 


 


Lower Extremity CT 5/9/18 0000 Signed





Impressions: 





 Service Date/Time:  Wednesday, May 9, 2018 21:10 - CONCLUSION:  1. Numerous 





 small sclerotic foci seen throughout the visualized structures in the left 





 pelvis in the left proximal femur. These could be from sclerotic bone 





 lesions/metastatic disease. Multiple bone islands and osteopoikilosis could 

have 





 a similar appearance. 2. Cortical thickening at the medial distal femoral 

shaft 





 with central calcification. This could be from a prior injury. This is 





 incompletely evaluated on this left hip CT examination. This is only seen on 

the 





  image. This finding should be correlated with any prior history.     

Wiley Ricci MD 


 


Knee X-Ray 5/9/18 0000 Signed





Impressions: 





 Service Date/Time:  Wednesday, May 9, 2018 13:31 - CONCLUSION:  1. Faint 





 chondrocalcinosis of the lateral menisci. 2. Otherwise negative. No fracture, 





 significant degenerative changes or effusion.     .     Telly Norton MD 


 


Hip and Pelvis X-Ray 5/9/18 0000 Signed





Impressions: 





 Service Date/Time:  Wednesday, May 9, 2018 13:29 - CONCLUSION:  1. Faint 





 heterogeneity of the axial skeleton as above. Subtle blastic metastasis cannot 





 be excluded. 2. Recommend CT scan of the pelvis without IV contrast for 

further 





 characterization.     Telly Norton MD 








PE at Discharge


GENERAL: This is a thin white-female patient sitting up in bed, pleasant, NAD


CARDIOVASCULAR: Regular rate and rhythm without murmurs, gallops, or rubs. 


RESPIRATORY: Clear to auscultation. Breath sounds equal bilaterally. No wheezes 

or crackles. 


GASTROINTESTINAL: Abdomen soft, non-tender, nondistended. 


MUSCULOSKELETAL: Extremities without clubbing, cyanosis, or edema. 


NEUROLOGICAL: Awake and alert. No focal neurological deficits.  Normal speech.


Transfer Summary


72 yo female with known CML on chemotherapy with decitabine admitted for bone 

pain. Her oncologist Dr. Conway was consulted. Clinical course noted below. 





LDH of 2,300 indicative of massive cell turnover and is seen in tumors with 

high proliferative phases. The rise in the alk phos and the findings of 

sclerotic lesions on the ct scan suggest that this is bone/bone marrow process. 

In spite of the fact that the patient received her last dose of decitabine 

Friday the wbc count is rising where it should be falling.  I suspect that she 

has evolved into AML or has a rapidly progressive CMML, neither compatible with 

life unless the course of the illness can be changed.  She learned that her 4th 

sibling, a sister, is a perfect match for a bone marrow transplant. Dr. Conway 

spoke with Dr. Van at [place] and he agrees to take her in transfer. She 

received 2 gm PO hydroxyurea for the bone pain which helped at time of 

transfer. She needs a bone marrow aspirate and bx and this should be done at 

[place] and not here.


Pt Condition on Discharge:  Stable


Discharge Disposition:  Disch to Another Hospital


Discharge Instructions


DIET: Follow Instructions for:  As Tolerated, No Restrictions


Activities you can perform:  Regular-No Restrictions


Follow up Referrals:  


Oncology - 1 Week with Arturo Conway MD


PCP Follow-up - 1 Month with Tyrone Beauchamp MD





New Medications:  


Enalapril (Enalapril) 10 Mg Tab


20 MG PO DAILY, #60 TAB





Prednisone (Prednisone) 20 Mg Tab


20 MG PO TID, #90 TAB





 


Continued Medications:  


Ascorbic Acid (Vitamin C) 1,000 Mg Tablet.er


1 TAB PO DAILY





Cholecalciferol (Vitamin D3) 5,000 Unit Cap


5000 UNITS PO DAILY for Nutritional Supplement, #30 CAP 0 Refills





Estrogens, Esterified (Menest) 0.625 Mg Tab


0.625 MG PO DAILY for Estrogen Supplements, #90 TAB 0 Refills





Milk Thistle (Milk Thistle) Unknown Strength Cap


Unknown Dose PO DAILY





Multivitamin (One Daily Multivitamin) 1 Each Tablet


1 TAB PO DAILY





Omeprazole (Omeprazole) 20 Mg Tab


20 MG PO BID, #30 TAB 0 Refills





Vitamin D & K (K2 Plus D3 100-1000 Mcg-Unit) 1,000 Unit-100 Mcg Tab


1 TAB PO DAILY





 


Discontinued Medications:  


Enalapril (Enalapril) 10 Mg Tab


10 MG PO DAILY, #90 TAB 3 Refills





Prednisone (Prednisone) 20 Mg Tab


20 MG PO DAILY, TAB 0 Refills

















Edward Lira MD R2 May 11, 2018 3:29 pm

## 2018-05-11 NOTE — HHI.DCPOC
Discharge Care Plan


Diagnosis:  


(1) CML (chronic myelocytic leukemia)


Goals to Promote Your Health


* To prevent worsening of your condition and complications


* To maintain your health at the optimal level


Directions to Meet Your Goals


*** Take your medications as prescribed


*** Follow your dietary instruction


*** Follow activity as directed








*** Keep your appointments as scheduled


*** Take your immunizations and boosters as scheduled


*** If your symptoms worsen call your PCP, if no PCP go to Urgent Care Center 

or Emergency Room***


*** Smoking is Dangerous to Your Health. Avoid second hand smoke***


***Call the 24-hour hour crisis hotline for domestic abuse at 1-958.957.9432***











Edward Lira MD R2 May 11, 2018 8:47 am

## 2018-05-11 NOTE — PD.ONC.PN
Subjective


Subjective


Remarks


tired  but slept better last night and less painful today





Objective


Data











  Date Time  Temp Pulse Resp B/P (MAP) Pulse Ox O2 Delivery O2 Flow Rate FiO2


 


5/11/18 07:45 98.8 109 19 160/83 (108) 97   


 


5/11/18 05:45  107  164/73 (103)    


 


5/11/18 04:36  107  145/73 (97)    


 


5/11/18 04:22  135  164/84 (110)    


 


5/11/18 04:00  127      


 


5/11/18 04:00 98.5 114 22 192/111 (138) 98   


 


5/11/18 00:00 98.9 116 22 192/112 (138) 97   


 


5/11/18 00:00  112      


 


5/10/18 20:00 98.5 117 22 190/112 (138) 98   


 


5/10/18 20:00  114      


 


5/10/18 17:59 98.0 111 16 191/100 (130) 100   


 


5/10/18 17:00  100      


 


5/10/18 16:30    182/100 (127)    


 


5/10/18 10:35    192/100 (130)    


 


5/10/18 09:00 98.4 92 18 197/107 (137) 97   














 5/11/18 5/11/18 5/11/18





 07:00 15:00 23:00


 


Intake Total 240 ml  


 


Output Total 400 ml  


 


Balance -160 ml  








Result Diagram:  


5/11/18 0556                                                                   

             5/11/18 0556





Laboratory Results





Laboratory Tests








Test


  5/11/18


05:56


 


White Blood Count 27.7 TH/MM3 


 


Red Blood Count 3.07 MIL/MM3 


 


Hemoglobin 8.7 GM/DL 


 


Hematocrit 25.9 % 


 


Mean Corpuscular Volume 84.2 FL 


 


Mean Corpuscular Hemoglobin 28.2 PG 


 


Mean Corpuscular Hemoglobin


Concent 33.4 % 


 


 


Red Cell Distribution Width 25.3 % 


 


Platelet Count 41 TH/MM3 


 


Mean Platelet Volume 9.8 FL 


 


CBC Comment AUTO DIFF 


 


Differential Total Cells


Counted 100 


 


 


Neutrophils % (Manual) 46 % 


 


Band Neutrophils % 12 % 


 


Lymphocytes % 25 % 


 


Monocytes % 13 % 


 


Neutrophils # (Manual) 17.2 TH/MM3 


 


Metamyelocytes 3 % 


 


Myelocytes 1 % 


 


Nucleated Red Blood Cells 2 /100 WBC 


 


Differential Comment


  FINAL DIFF


MANUAL


 


Platelet Estimate LOW 


 


Platelet Morphology Comment NORMAL 


 


Tear Drop Cells 1+ 


 


Ovalocytes 2+ 


 


Blood Urea Nitrogen 13 MG/DL 


 


Creatinine 0.71 MG/DL 


 


Random Glucose 149 MG/DL 


 


Total Protein 6.6 GM/DL 


 


Albumin 3.5 GM/DL 


 


Calcium Level 8.4 MG/DL 


 


Alkaline Phosphatase 1353 U/L 


 


Aspartate Amino Transf


(AST/SGOT) 170 U/L 


 


 


Alanine Aminotransferase


(ALT/SGPT) 68 U/L 


 


 


Total Bilirubin 1.7 MG/DL 


 


Sodium Level 138 MEQ/L 


 


Potassium Level 4.1 MEQ/L 


 


Chloride Level 101 MEQ/L 


 


Carbon Dioxide Level 27.4 MEQ/L 


 


Anion Gap 10 MEQ/L 


 


Estimat Glomerular Filtration


Rate 81 ML/MIN 


 











Administered Medications








 Medications


  (Trade)  Dose


 Ordered  Sig/Neymar


 Route


 PRN Reason  Start Time


 Stop Time Status Last Admin


Dose Admin


 


 Acetaminophen/


 Hydrocodone Bitart


  (Norco  Mg)  1 tab  Q4H  PRN


 PO


 PAIN SCALE 6 TO 10  5/9/18 16:45


    5/11/18 08:04


 


 


 Senna/Docusate


 Sodium


  (Alexsandra-Colace)  1 tab  BID


 PO


   5/9/18 21:00


    5/11/18 07:55


 


 


 Magnesium


 Hydroxide


  (Milk Of


 Magnesia Liq)  30 ml  Q12H  PRN


 PO


 Mild constipation  5/9/18 18:15


    5/11/18 07:54


 


 


 Lactulose


  (Lactulose Liq)  30 ml  DAILY  PRN


 PO


 SEVERE CONSITIPATION  5/9/18 18:15


    5/9/18 22:02


 


 


 Temazepam


  (Restoril)  15 mg  HS  PRN


 PO


 SLEEP  5/9/18 20:15


    5/10/18 01:09


 


 


 Prednisone


  (Deltasone)  20 mg  TID


 PO


   5/10/18 09:00


    5/11/18 07:55


 


 


 Pantoprazole


 Sodium


  (Protonix)  40 mg  DAILY


 PO


   5/10/18 09:00


    5/11/18 07:56


 


 


 Ondansetron HCl


  (Zofran  Odt)  4 mg  Q4H  PRN


 PO


 nausea  5/10/18 11:00


    5/10/18 10:47


 


 


 Hydromorphone HCl


  (Dilaudid Pf Inj)  0.8 mg  Q2HR  PRN


 IV PUSH


 BREAKTHROUGH PAIN  5/10/18 13:30


    5/10/18 13:45


 


 


 Sodium Chloride  1,000 ml @ 


 42 mls/hr  [id number]


 IV


   5/10/18 14:00


    5/10/18 15:16


 


 


 Hydralazine HCl


  (Apresoline Inj)  10 mg  Q6H  PRN


 IV PUSH


 SBP> OR = 180, DBP> OR = 100  5/10/18 17:00


    5/11/18 03:45


 


 


 Labetalol HCl


  (Trandate Inj)  10 mg  Q6H  PRN


 IV PUSH


 SEE LABEL COMMENTS  5/11/18 04:15


    5/11/18 04:27


 


 


 Enalapril Maleate


  (Vasotec)  20 mg  DAILY


 PO


   5/11/18 09:00


    5/11/18 07:55


 








Objective Remarks


GENERAL: tired and weak


SKIN: Warm and dry.


HEAD: Normocephalic.


EYES: No scleral icterus. No injection or drainage. 


NECK: Supple, trachea midline. No JVD or lymphadenopathy.


LYMPHATIC: No adenopathy.


CARDIOVASCULAR: Regular rate and rhythm without murmurs. 


RESPIRATORY: Breath sounds equal bilaterally. No accessory muscle use.


GASTROINTESTINAL: Abdomen soft, non-tender, nondistended. 


EXTREMITIES: No cyanosis, or edema. 


MUSCULOSKELETAL: Adequate muscle tone.


NEUROLOGICAL: No obvious focal deficit. Awake, alert, and oriented x3.


PSYCHIATRIC: worried





Assessment/Plan


Assessment


72y/o with chronic myelomonocytic leukemia admitted for severe uncontrolled 

pain. 


h/o Hypertension. Irritable bowel syndrome. History of recurrent hematuria. 

Diverticulosis.


Plan


1: wbc count rising in spite of completing decitabine one week ago.  As per the 

note from yesterday will transfer to [place] and she will need a bone marrow 

aspirate and bx before  a decision can be made about the direction of 

treatment.  Fortunately she now has a match with a sister for a bone marrow 

transplant which I believe is her best chance of long term survival.   Will 

proceed with transfer to [place] today.  Have given her MOM for constipation and 

further treatment decisions can be made after transfer.  Above discussed with 

patient and her male , Trell.  Appreciate all the help from the staff.











Arturo Conway MD May 11, 2018 08:55